# Patient Record
Sex: FEMALE | Race: WHITE | Employment: FULL TIME | URBAN - METROPOLITAN AREA
[De-identification: names, ages, dates, MRNs, and addresses within clinical notes are randomized per-mention and may not be internally consistent; named-entity substitution may affect disease eponyms.]

---

## 2017-04-17 ENCOUNTER — HOSPITAL ENCOUNTER (OUTPATIENT)
Dept: MAMMOGRAPHY | Facility: HOSPITAL | Age: 32
Discharge: HOME | End: 2017-04-17
Attending: SURGERY
Payer: COMMERCIAL

## 2017-04-17 DIAGNOSIS — D24.1 INTRADUCTAL PAPILLOMA OF RIGHT BREAST: ICD-10-CM

## 2017-04-17 PROCEDURE — 77066 DX MAMMO INCL CAD BI: CPT

## 2017-04-17 PROCEDURE — 76642 ULTRASOUND BREAST LIMITED: CPT

## 2017-04-17 PROCEDURE — 77062 BREAST TOMOSYNTHESIS BI: CPT

## 2017-06-14 NOTE — TELEPHONE ENCOUNTER
Pt needs appt (migraines) with dressler before any refills. Please have them call to schedule.  Thanks

## 2017-06-28 RX ORDER — TOPIRAMATE 100 MG/1
TABLET, FILM COATED ORAL
Qty: 135 TABLET | OUTPATIENT
Start: 2017-06-28

## 2017-10-13 DIAGNOSIS — Z12.39 BREAST SCREENING: Primary | ICD-10-CM

## 2017-10-27 DIAGNOSIS — D24.1 INTRADUCTAL PAPILLOMA OF BREAST, RIGHT: Primary | ICD-10-CM

## 2017-10-30 ENCOUNTER — HOSPITAL ENCOUNTER (OUTPATIENT)
Dept: MAMMOGRAPHY | Facility: HOSPITAL | Age: 32
Discharge: HOME | End: 2017-10-30
Attending: SURGERY
Payer: COMMERCIAL

## 2017-10-30 DIAGNOSIS — D24.1 INTRADUCTAL PAPILLOMA OF BREAST, RIGHT: ICD-10-CM

## 2017-10-30 DIAGNOSIS — Z12.39 BREAST SCREENING: ICD-10-CM

## 2017-10-30 PROCEDURE — 76641 ULTRASOUND BREAST COMPLETE: CPT | Performed by: SURGERY

## 2017-10-30 PROCEDURE — 77062 BREAST TOMOSYNTHESIS BI: CPT | Performed by: SURGERY

## 2017-10-30 PROCEDURE — 77066 DX MAMMO INCL CAD BI: CPT | Performed by: SURGERY

## 2017-11-06 ENCOUNTER — OFFICE VISIT (OUTPATIENT)
Dept: SURGERY | Facility: CLINIC | Age: 32
End: 2017-11-06

## 2017-11-06 VITALS
TEMPERATURE: 98 F | RESPIRATION RATE: 16 BRPM | HEIGHT: 64 IN | HEART RATE: 76 BPM | DIASTOLIC BLOOD PRESSURE: 74 MMHG | WEIGHT: 144 LBS | SYSTOLIC BLOOD PRESSURE: 114 MMHG | BODY MASS INDEX: 24.59 KG/M2

## 2017-11-06 DIAGNOSIS — Z80.41 FAMILY HISTORY OF OVARIAN CARCINOMA: ICD-10-CM

## 2017-11-06 DIAGNOSIS — Z80.3 FAMILY HISTORY OF BREAST CANCER: ICD-10-CM

## 2017-11-06 DIAGNOSIS — D24.1 INTRADUCTAL PAPILLOMA OF RIGHT BREAST: Primary | ICD-10-CM

## 2017-11-06 PROCEDURE — 99212 OFFICE O/P EST SF 10 MIN: CPT | Performed by: SURGERY

## 2017-11-06 NOTE — PATIENT INSTRUCTIONS
OPTIONS FOR MANAGING BENIGN BREAST PROBLEMS    Many women have changes in the way their breast feel or the way they look on mammograms: cyst, overactive milk ducts, and tenderness. These are usually caused by hormone changes.   The suggested nutritional an

## 2017-11-06 NOTE — PROGRESS NOTES
Follow Up Visit Note       Active Problems      1. Intraductal papilloma of right breast    2. Family history of ovarian carcinoma    3. Family history of breast cancer          Chief Complaint   Patient presents with:   Follow Up To Mammogram: Est Pt f/u a ORAL SURGERY PROCEDURE      Comment: Norman Teeth Extraction  1/1/99: OTHER SURGICAL HISTORY      Comment: hand surgery x4    The family history and social history have been reviewed by me today.     Family History   Problem Relation Age of Onset   • Ovaria Take  by mouth as needed. Disp:  Rfl:         Review of Systems  The Review of Systems has been reviewed by me during today.   Review of Systems     Physical Findings   /74   Pulse 76   Temp 98.1 °F (36.7 °C)   Resp 16   Ht 64\"   Wt 144 lb   LMP  (LM no change. 11:00 2 cm from the nipple 4 x 2 x 4 mm unchanged. Nodule 7:00 4 cm from the nipple 4 x 4 by 2 mm previously 5 x   4 x 2 mm therefore minimally smaller. 8:00 retroareolar small nodule 7 x 6 x 3 mm previously 7 x 5 x 3 mm essentially stable.  Left COUNSELOR  SHAHNAZ PARRA 2D+3D DIAGNOSTIC SHAHNAZ SHERWOOD (CPT=77066/54005)    Follow Up  Return in about 1 year (around 11/6/2018).     Laisha Proctor MD

## 2017-11-08 ENCOUNTER — OFFICE VISIT (OUTPATIENT)
Dept: GENETICS | Facility: HOSPITAL | Age: 32
End: 2017-11-08
Payer: COMMERCIAL

## 2017-11-08 DIAGNOSIS — Z80.41 FAMILY HISTORY OF OVARIAN CANCER: ICD-10-CM

## 2017-11-08 DIAGNOSIS — Z80.3 FAMILY HISTORY OF BREAST CANCER: Primary | ICD-10-CM

## 2017-11-08 PROCEDURE — 96040 HC GENETIC COUNSELING EA 30 MIN: CPT | Performed by: GENETIC COUNSELOR, MS

## 2017-11-10 NOTE — PROGRESS NOTES
Referring Provider: Dr. Toni Shah    Additional Provider: RM Cage    Reason for Referral:  Ms. Ruth Nye was referred for genetic counseling because of a family history of breast and ovarian cancer.   Ms. Santiago Gagandeep is a 33year-old two sons, ages 6 and 3, who are in good health. Please see the pedigree for additional family history information. There is no known Ashkenazi Scientologist ancestry    Psychosocial History:   Ms. Karyn Saenz is .   She is a hairstylist.  She indicated signi were discussed including the potential implications of test results on clinical management.      If a gene mutation is not identified (negative result), it is still possible that Ms. Mirza Kohli has a mutation in one of these genes that was not detected by the g individuals with mutations in these genes. If she were to test positive for a deleterious mutation, her first degree family members would have a 50% chance of carrying the same mutation.   At-risk adults (>18) would have the option of pursuing targeted gene

## 2017-11-17 ENCOUNTER — GENETICS ENCOUNTER (OUTPATIENT)
Dept: GENETICS | Facility: HOSPITAL | Age: 32
End: 2017-11-17

## 2017-11-17 NOTE — PROGRESS NOTES
Referring Provider: Dr. Judy Duron    Additional Provider: RM Rodriguez    Reason for Referral:  Ms. Jesika Duran had [facility] [place] genetic testing performed on 11/8/17 because of a family history of breast and ovarian cancer.        Genetic Te meet NCCN guidelines for increased breast cancer screening, including breast MRI and mammograms. I briefly discussed benefits and limitations of breast MRI. MsGrisel Hayward was encouraged to further discuss her breast screening with her physicians.        Ms. Kusum Diaz

## 2018-08-06 ENCOUNTER — TELEPHONE (OUTPATIENT)
Dept: FAMILY MEDICINE CLINIC | Facility: CLINIC | Age: 33
End: 2018-08-06

## 2018-08-06 DIAGNOSIS — Z00.00 LABORATORY EXAMINATION ORDERED AS PART OF A ROUTINE GENERAL MEDICAL EXAMINATION: Primary | ICD-10-CM

## 2018-08-06 NOTE — TELEPHONE ENCOUNTER
cpx appt is with stephanie dressler. **see physical pended lab orders-advise any other labs and return to triage to call pt-thanks!

## 2018-08-06 NOTE — TELEPHONE ENCOUNTER
Pt has physical scheduled for 8/13/18. She would like to know if she should do any blood work prior to appt. She also was bit by a tic yesterday. She has not seen a rash or bullseye but would like to know what to do. Please advise. Thank you.

## 2018-08-06 NOTE — TELEPHONE ENCOUNTER
Labs ordered. Where did she contract the tick ([place], [place] Was tick removed? How long attached to her?

## 2018-08-06 NOTE — TELEPHONE ENCOUNTER
Call to pt-advised edward lab orders (fasting blood work and urine) placed. Pt sts was at her sister's new house in [address], only ffelt tick and swiped it off, does not believe it was imbedded at all. Advised will update lucie slade this info.  Instruct

## 2018-08-08 ENCOUNTER — APPOINTMENT (OUTPATIENT)
Dept: LAB | Age: 33
End: 2018-08-08
Attending: FAMILY MEDICINE
Payer: COMMERCIAL

## 2018-08-08 DIAGNOSIS — Z00.00 LABORATORY EXAMINATION ORDERED AS PART OF A ROUTINE GENERAL MEDICAL EXAMINATION: ICD-10-CM

## 2018-08-08 PROCEDURE — 80061 LIPID PANEL: CPT

## 2018-08-08 PROCEDURE — 85027 COMPLETE CBC AUTOMATED: CPT

## 2018-08-08 PROCEDURE — 80053 COMPREHEN METABOLIC PANEL: CPT

## 2018-08-08 PROCEDURE — 36415 COLL VENOUS BLD VENIPUNCTURE: CPT

## 2018-08-08 PROCEDURE — 84443 ASSAY THYROID STIM HORMONE: CPT

## 2018-08-13 ENCOUNTER — OFFICE VISIT (OUTPATIENT)
Dept: FAMILY MEDICINE CLINIC | Facility: CLINIC | Age: 33
End: 2018-08-13
Payer: COMMERCIAL

## 2018-08-13 VITALS
HEART RATE: 76 BPM | OXYGEN SATURATION: 99 % | DIASTOLIC BLOOD PRESSURE: 60 MMHG | SYSTOLIC BLOOD PRESSURE: 100 MMHG | HEIGHT: 64 IN | RESPIRATION RATE: 16 BRPM | WEIGHT: 146.75 LBS | BODY MASS INDEX: 25.05 KG/M2

## 2018-08-13 DIAGNOSIS — F41.9 ANXIETY: ICD-10-CM

## 2018-08-13 DIAGNOSIS — R79.9 ABNORMAL BLOOD CHEMISTRY: ICD-10-CM

## 2018-08-13 DIAGNOSIS — Z00.00 ROUTINE GENERAL MEDICAL EXAMINATION AT A HEALTH CARE FACILITY: Primary | ICD-10-CM

## 2018-08-13 DIAGNOSIS — N94.10 DYSPAREUNIA IN FEMALE: ICD-10-CM

## 2018-08-13 PROCEDURE — 99213 OFFICE O/P EST LOW 20 MIN: CPT | Performed by: FAMILY MEDICINE

## 2018-08-13 PROCEDURE — 88175 CYTOPATH C/V AUTO FLUID REDO: CPT | Performed by: FAMILY MEDICINE

## 2018-08-13 PROCEDURE — 87624 HPV HI-RISK TYP POOLED RSLT: CPT | Performed by: FAMILY MEDICINE

## 2018-08-13 PROCEDURE — 99395 PREV VISIT EST AGE 18-39: CPT | Performed by: FAMILY MEDICINE

## 2018-08-13 RX ORDER — SERTRALINE HYDROCHLORIDE 25 MG/1
25 TABLET, FILM COATED ORAL DAILY
Qty: 30 TABLET | Refills: 1 | Status: SHIPPED | OUTPATIENT
Start: 2018-08-13 | End: 2018-09-11

## 2018-08-13 NOTE — PROGRESS NOTES
CHIEF COMPLAINT   Well woman exam  HPI:   Max Rowley is a 29year old female who presents for a complete physical exam.    Seeing neuro for migraines - dr. Ghanshyam Hartmann. Had labs done - here to review.     Procedure on cervix more than 10 years ago - needed. Disp:  Rfl:    Cyclobenzaprine HCl 5 MG Oral Tab Take 1 tablet (5 mg total) by mouth 3 (three) times daily as needed for Muscle spasms.  1/2 to 1 po bid prn Disp: 60 tablet Rfl: 1      Past Medical History:   Diagnosis Date   • Laboratory examinatio REVIEW OF SYSTEMS:   GENERAL: feels well otherwise  SKIN: no complaint of any unusual skin lesions  EYES: no complaint of blurred vision or double vision  HEENT: no complaint of nasal congestion, sinus pain or ST  LUNGS: no complaint of shortness of br this.  We discussed starting Zoloft 25 mg once daily. She was given a prescription for this. I discussed potential side effects. She was advised to follow-up with me in 4 weeks. We also reviewed her recent blood work. Repeat a CMP in 4 weeks.   Refer t

## 2018-08-14 LAB — HPV I/H RISK 1 DNA SPEC QL NAA+PROBE: NEGATIVE

## 2018-09-07 ENCOUNTER — APPOINTMENT (OUTPATIENT)
Dept: LAB | Age: 33
End: 2018-09-07
Attending: FAMILY MEDICINE
Payer: COMMERCIAL

## 2018-09-07 DIAGNOSIS — R79.9 ABNORMAL BLOOD CHEMISTRY: ICD-10-CM

## 2018-09-07 LAB
ALBUMIN SERPL-MCNC: 4 G/DL (ref 3.5–4.8)
ALBUMIN/GLOB SERPL: 1.5 {RATIO} (ref 1–2)
ALP LIVER SERPL-CCNC: 35 U/L (ref 37–98)
ALT SERPL-CCNC: 23 U/L (ref 14–54)
ANION GAP SERPL CALC-SCNC: 7 MMOL/L (ref 0–18)
AST SERPL-CCNC: 10 U/L (ref 15–41)
BILIRUB SERPL-MCNC: 0.6 MG/DL (ref 0.1–2)
BUN BLD-MCNC: 12 MG/DL (ref 8–20)
BUN/CREAT SERPL: 11.4 (ref 10–20)
CALCIUM BLD-MCNC: 8.6 MG/DL (ref 8.3–10.3)
CHLORIDE SERPL-SCNC: 111 MMOL/L (ref 101–111)
CO2 SERPL-SCNC: 21 MMOL/L (ref 22–32)
CREAT BLD-MCNC: 1.05 MG/DL (ref 0.55–1.02)
GLOBULIN PLAS-MCNC: 2.7 G/DL (ref 2.5–4)
GLUCOSE BLD-MCNC: 92 MG/DL (ref 70–99)
M PROTEIN MFR SERPL ELPH: 6.7 G/DL (ref 6.1–8.3)
OSMOLALITY SERPL CALC.SUM OF ELEC: 287 MOSM/KG (ref 275–295)
POTASSIUM SERPL-SCNC: 4.4 MMOL/L (ref 3.6–5.1)
SODIUM SERPL-SCNC: 139 MMOL/L (ref 136–144)

## 2018-09-07 PROCEDURE — 80053 COMPREHEN METABOLIC PANEL: CPT

## 2018-09-07 PROCEDURE — 36415 COLL VENOUS BLD VENIPUNCTURE: CPT

## 2018-09-10 ENCOUNTER — TELEPHONE (OUTPATIENT)
Dept: FAMILY MEDICINE CLINIC | Facility: CLINIC | Age: 33
End: 2018-09-10

## 2018-09-10 DIAGNOSIS — R79.89 ELEVATED SERUM CREATININE: Primary | ICD-10-CM

## 2018-09-11 NOTE — PROGRESS NOTES
Sonido Beyer is a 29year old female. CHIEF COMPLAINT   Follow up on anxiety  HPI:   Some improvement with zoloft. Able to control frustration better. Still a lot of effort to control her frustration sowould like to try increasing the zoloft dose. History    Tobacco Use      Smoking status: Never Smoker      Smokeless tobacco: Never Used    Alcohol use: Yes      Comment: Occasionally    Drug use: No       REVIEW OF SYSTEMS:   GENERAL HEALTH: feels well otherwise  SKIN: denies any unusual skin lesion

## 2018-09-16 ENCOUNTER — HOSPITAL ENCOUNTER (OUTPATIENT)
Dept: ULTRASOUND IMAGING | Age: 33
Discharge: HOME | End: 2018-09-16
Attending: FAMILY MEDICINE
Payer: COMMERCIAL

## 2018-09-16 DIAGNOSIS — R10.2 PELVIC PAIN: ICD-10-CM

## 2018-09-16 PROCEDURE — 76856 US EXAM PELVIC COMPLETE: CPT | Performed by: FAMILY MEDICINE

## 2018-09-16 PROCEDURE — 76830 TRANSVAGINAL US NON-OB: CPT | Performed by: FAMILY MEDICINE

## 2018-09-16 PROCEDURE — 93975 VASCULAR STUDY: CPT | Performed by: FAMILY MEDICINE

## 2018-10-29 ENCOUNTER — OFFICE VISIT (OUTPATIENT)
Dept: OBGYN CLINIC | Facility: CLINIC | Age: 33
End: 2018-10-29
Payer: COMMERCIAL

## 2018-10-29 VITALS
SYSTOLIC BLOOD PRESSURE: 120 MMHG | WEIGHT: 142 LBS | BODY MASS INDEX: 23.95 KG/M2 | DIASTOLIC BLOOD PRESSURE: 68 MMHG | HEIGHT: 64.5 IN

## 2018-10-29 DIAGNOSIS — Z30.09 COUNSELING FOR BIRTH CONTROL REGARDING INTRAUTERINE DEVICE (IUD): Primary | ICD-10-CM

## 2018-10-29 PROCEDURE — 99203 OFFICE O/P NEW LOW 30 MIN: CPT | Performed by: NURSE PRACTITIONER

## 2018-10-29 NOTE — PROGRESS NOTES
Here for new gynecology visit. 29year old G 2 P 2. No LMP recorded (lmp unknown). Patient is not currently having periods (Reason: IUD - Intrauterine Device). .     Here to discuss replacement of her Mirena IUD.  She had her last one placed 9/2013 and wou (ADVIL) 200 MG Oral Cap Take  by mouth as needed. Disp:  Rfl:    Butalbital-APAP-Caffeine -40 MG Oral Tab Take 1 tablet by mouth every 4 (four) hours as needed for Pain.  Disp: 90 tablet Rfl: 3   Cyclobenzaprine HCl 5 MG Oral Tab Take 1 tablet (5 mg t procedure. RTC for IUD removal and new Mirena insertion.

## 2018-10-29 NOTE — PROGRESS NOTES
Maddie Lozada is a 29year old female. CHIEF COMPLAINT   Follow up on anxiety  HPI:   Here for follow up on zoloft. Anxiety is much better with medication. More patient with her kids. Dose seems adequate. No depression.   Doesn't want to do  encounter. Meds & Refills for this Visit:  Requested Prescriptions     Signed Prescriptions Disp Refills   • Sertraline HCl (ZOLOFT) 50 MG Oral Tab 90 tablet 1     Sig: Take 1 tablet (50 mg total) by mouth daily. Patient declined counseling.   See in

## 2018-10-31 ENCOUNTER — TELEPHONE (OUTPATIENT)
Dept: FAMILY MEDICINE CLINIC | Facility: CLINIC | Age: 33
End: 2018-10-31

## 2018-10-31 NOTE — TELEPHONE ENCOUNTER
Spoke to patient and just as it says, white spots like whiteheads around each nipple that are tender to the touch noticed this AM. See mammo 2016-fibroadenoma/papiloma at that time. Not pregnant. Breast size unchanged and no other symptoms.

## 2018-10-31 NOTE — TELEPHONE ENCOUNTER
1. What are your symptoms? White spots around nipples, both sides look like white heads and very tender. Asked her mom and she said its looks like when her breast were engorged whit breastfeeding.  Pt said she is not pregnant and actually couldn't breastfe

## 2018-10-31 NOTE — TELEPHONE ENCOUNTER
It's hard to say without seeing it but if there are many (more than 5) and they are around both nipples, it is likely the [place] glands which become more prominent with pregnancy and breastfeeding.   Since she isn't breastfeeding and presumably not preg

## 2018-10-31 NOTE — TELEPHONE ENCOUNTER
Call to pt-advised lucie ABDALLA is out of ofc today but did review her call info from this morning. Advised of lucie ABDALLA comments/recommendations. Pt sts \"just saw lucie ABDALLA on Monday.  Had gyne appt same day w plan to have IUD removed because it

## 2018-11-01 ENCOUNTER — TELEPHONE (OUTPATIENT)
Dept: OBGYN CLINIC | Facility: CLINIC | Age: 33
End: 2018-11-01

## 2018-11-01 DIAGNOSIS — N91.2 AMENORRHEA: Primary | ICD-10-CM

## 2018-11-01 NOTE — TELEPHONE ENCOUNTER
Jaime allredg to ID VM     Per Nikolai ABBASI needs to be seen for the white spots in her breast    And that she needs to ask her OB GYNE re when to repeat pregnancy test

## 2018-11-01 NOTE — TELEPHONE ENCOUNTER
Patient states she has white spots on breasts, and  her primary doctor is concerned that she might be  pregnant. She does have and IUD and took a pregnancy test that came back negative.  Please call to advise

## 2018-11-01 NOTE — TELEPHONE ENCOUNTER
If she would feel better having an HCG she can come in for a qualitative. If they persist, become tender, or other changes she should come in for evaluation.

## 2018-11-01 NOTE — TELEPHONE ENCOUNTER
30 y/o calling with raised, white spots on nipple. She was advised to f/u with her gyne by PCP. She was just seen on 10/29/18. She is going to have her IUD replaced in near future. She now has concerns for pregnancy.  She took a UPT that was negative but sh

## 2018-11-01 NOTE — TELEPHONE ENCOUNTER
Per pt she has an appt with her OB/GYNE 11/12/2018 and will discuss further with them.   She states she will call them today to find out when to recheck the pregnancy test.

## 2018-11-01 NOTE — TELEPHONE ENCOUNTER
She should ask her gyne when they would like her to repeat a pregnancy test if she took one and was negative but isn't certain that she isn't pregnant.

## 2018-11-02 ENCOUNTER — LAB ENCOUNTER (OUTPATIENT)
Dept: LAB | Age: 33
End: 2018-11-02
Attending: NURSE PRACTITIONER
Payer: COMMERCIAL

## 2018-11-02 DIAGNOSIS — N91.2 AMENORRHEA: ICD-10-CM

## 2018-11-02 PROCEDURE — 36415 COLL VENOUS BLD VENIPUNCTURE: CPT

## 2018-11-02 PROCEDURE — 84703 CHORIONIC GONADOTROPIN ASSAY: CPT

## 2018-11-08 ENCOUNTER — TELEPHONE (OUTPATIENT)
Dept: OBGYN CLINIC | Facility: CLINIC | Age: 33
End: 2018-11-08

## 2018-11-08 NOTE — TELEPHONE ENCOUNTER
Received previous medical records from [facility].  Pt seen her on 10/29/18 and has f/u appt on    Future Appointments   Date Time Provider Alyssa Hutchison   11/12/2018 12:30 PM NAOMI Curran [facility]   3/18/2019 10

## 2018-11-12 ENCOUNTER — OFFICE VISIT (OUTPATIENT)
Dept: OBGYN CLINIC | Facility: CLINIC | Age: 33
End: 2018-11-12
Payer: COMMERCIAL

## 2018-11-12 VITALS
WEIGHT: 148 LBS | SYSTOLIC BLOOD PRESSURE: 124 MMHG | DIASTOLIC BLOOD PRESSURE: 64 MMHG | BODY MASS INDEX: 25.27 KG/M2 | HEIGHT: 64 IN

## 2018-11-12 DIAGNOSIS — Z01.812 PRE-PROCEDURAL LABORATORY EXAMINATION: ICD-10-CM

## 2018-11-12 DIAGNOSIS — Z30.433 ENCOUNTER FOR REMOVAL AND REINSERTION OF IUD: Primary | ICD-10-CM

## 2018-11-12 PROCEDURE — 58301 REMOVE INTRAUTERINE DEVICE: CPT | Performed by: NURSE PRACTITIONER

## 2018-11-12 PROCEDURE — 81025 URINE PREGNANCY TEST: CPT | Performed by: NURSE PRACTITIONER

## 2018-11-12 PROCEDURE — 58300 INSERT INTRAUTERINE DEVICE: CPT | Performed by: NURSE PRACTITIONER

## 2018-11-12 NOTE — PROGRESS NOTES
Procedure:    S:  The patient was consented for removal of her current Mirena IUD and new Mirena placement. Risks and benefits were discussed including infection, uterine perforation, uterine expulsion, PID, ectopic and intrauterine pregnancy.  All ricardo

## 2018-12-04 NOTE — TELEPHONE ENCOUNTER
Prenatal record 2013 delivery  Prenatal records 2009 delivery  2009 IUD Mirena placement  Mirena placement 9/2013- last visit was IUD check 2013  Last normal pap 9/2013  All labs and US reviewed in the timeframe above.    Records will be sent to scan into E

## 2018-12-10 ENCOUNTER — OFFICE VISIT (OUTPATIENT)
Dept: OBGYN CLINIC | Facility: CLINIC | Age: 33
End: 2018-12-10
Payer: COMMERCIAL

## 2018-12-10 VITALS
WEIGHT: 147 LBS | DIASTOLIC BLOOD PRESSURE: 68 MMHG | BODY MASS INDEX: 25.1 KG/M2 | SYSTOLIC BLOOD PRESSURE: 114 MMHG | HEIGHT: 64 IN | HEART RATE: 60 BPM

## 2018-12-10 DIAGNOSIS — Z30.431 IUD CHECK UP: Primary | ICD-10-CM

## 2018-12-10 PROCEDURE — 99212 OFFICE O/P EST SF 10 MIN: CPT | Performed by: NURSE PRACTITIONER

## 2018-12-10 NOTE — PROGRESS NOTES
Gyne note     S: patient is a 29year old yo  here for IUD check   Bleeding: regular menses, no breakthrough bleeding after insertion.   Pain: minimal cramping after placement for 4 days, now none     O:/68   Pulse 60   Ht 64\"   Wt 147 lb   LM

## 2018-12-11 ENCOUNTER — MED REC SCAN ONLY (OUTPATIENT)
Dept: OBGYN CLINIC | Facility: CLINIC | Age: 33
End: 2018-12-11

## 2019-05-20 ENCOUNTER — OFFICE VISIT (OUTPATIENT)
Dept: FAMILY MEDICINE CLINIC | Facility: CLINIC | Age: 34
End: 2019-05-20
Payer: COMMERCIAL

## 2019-05-20 VITALS
SYSTOLIC BLOOD PRESSURE: 106 MMHG | DIASTOLIC BLOOD PRESSURE: 70 MMHG | HEIGHT: 64 IN | BODY MASS INDEX: 24.41 KG/M2 | WEIGHT: 143 LBS | HEART RATE: 80 BPM | TEMPERATURE: 99 F | RESPIRATION RATE: 12 BRPM

## 2019-05-20 DIAGNOSIS — H92.01 RIGHT EAR PAIN: Primary | ICD-10-CM

## 2019-05-20 PROCEDURE — 99213 OFFICE O/P EST LOW 20 MIN: CPT | Performed by: FAMILY MEDICINE

## 2019-05-20 RX ORDER — NEOMYCIN SULFATE, POLYMYXIN B SULFATE, HYDROCORTISONE 3.5; 10000; 1 MG/ML; [USP'U]/ML; MG/ML
SOLUTION/ DROPS AURICULAR (OTIC)
Qty: 1 BOTTLE | Refills: 0 | Status: SHIPPED | OUTPATIENT
Start: 2019-05-20 | End: 2019-07-10 | Stop reason: ALTCHOICE

## 2019-05-20 RX ORDER — CEPHALEXIN 500 MG/1
500 CAPSULE ORAL 2 TIMES DAILY
Qty: 20 CAPSULE | Refills: 0 | Status: SHIPPED | OUTPATIENT
Start: 2019-05-20 | End: 2019-07-10 | Stop reason: ALTCHOICE

## 2019-05-20 NOTE — PROGRESS NOTES
Shai Morejon is a 36year old female. CHIEF COMPLAINT   Right ear pain  HPI:   Right ear pain started yesterday. Slightly itchy for a couple of months. Woke up yesterday and the ear was red and radiated into her jaw. Wind bothered her yesterday. along the middle part of the pinna with slightly dry skin.   Canal with small amount of cerumen so unable to visualize entire canal.  Visualized portion of TM is normal.   NECK: supple,no adenopathy  CARDIO: RRR without murmur    ASSESSMENT AND PLAN:     Ri

## 2019-06-18 DIAGNOSIS — Z80.41 FAMILY HISTORY OF OVARIAN CANCER: ICD-10-CM

## 2019-06-18 DIAGNOSIS — D24.1 INTRADUCTAL PAPILLOMA OF BREAST, RIGHT: Primary | ICD-10-CM

## 2019-06-18 DIAGNOSIS — Z80.3 FAMILY HISTORY OF BREAST CANCER: ICD-10-CM

## 2019-07-02 ENCOUNTER — HOSPITAL ENCOUNTER (OUTPATIENT)
Dept: MAMMOGRAPHY | Facility: HOSPITAL | Age: 34
Discharge: HOME | End: 2019-07-02
Attending: SURGERY
Payer: COMMERCIAL

## 2019-07-02 DIAGNOSIS — Z80.41 FAMILY HISTORY OF OVARIAN CANCER: ICD-10-CM

## 2019-07-02 DIAGNOSIS — Z80.3 FAMILY HISTORY OF BREAST CANCER: ICD-10-CM

## 2019-07-02 DIAGNOSIS — D24.1 INTRADUCTAL PAPILLOMA OF BREAST, RIGHT: ICD-10-CM

## 2019-07-02 PROCEDURE — 77066 DX MAMMO INCL CAD BI: CPT | Performed by: SURGERY

## 2019-07-02 PROCEDURE — 76641 ULTRASOUND BREAST COMPLETE: CPT | Performed by: SURGERY

## 2019-07-02 PROCEDURE — 77062 BREAST TOMOSYNTHESIS BI: CPT | Performed by: SURGERY

## 2019-07-10 ENCOUNTER — OFFICE VISIT (OUTPATIENT)
Dept: SURGERY | Facility: CLINIC | Age: 34
End: 2019-07-10
Payer: COMMERCIAL

## 2019-07-10 VITALS
DIASTOLIC BLOOD PRESSURE: 76 MMHG | RESPIRATION RATE: 16 BRPM | SYSTOLIC BLOOD PRESSURE: 111 MMHG | HEART RATE: 80 BPM | BODY MASS INDEX: 25.78 KG/M2 | WEIGHT: 151 LBS | HEIGHT: 64 IN

## 2019-07-10 DIAGNOSIS — N63.0 BREAST NODULE: ICD-10-CM

## 2019-07-10 DIAGNOSIS — Z80.3 FAMILY HISTORY OF BREAST CANCER: Primary | ICD-10-CM

## 2019-07-10 DIAGNOSIS — D24.1 INTRADUCTAL PAPILLOMA OF RIGHT BREAST: ICD-10-CM

## 2019-07-10 PROCEDURE — 99213 OFFICE O/P EST LOW 20 MIN: CPT | Performed by: SURGERY

## 2019-07-10 NOTE — PROGRESS NOTES
Follow Up Visit Note       Active Problems      1. Family history of breast cancer    2. Breast nodule    3. Intraductal papilloma of right breast          Chief Complaint   Patient presents with:   Follow - Up: f/u mammogram done 7/2        History of Pres BIOPSY Right 2016    Performed by Adalberto Morejon MD at [facility] MAIN OR   • SHAHNAZ NEEDLE LOCALIZATION W/ SPECIMEN 1 SITE RIGHT  2016    Benign, papilloma   • NEEDLE BIOPSY RIGHT  2016    David-cut with Dr. Zoey Bowers - papilloma   •       Times 1   • (ZOLOFT) 50 MG Oral Tab, Take 1 tablet (50 mg total) by mouth daily. , Disp: 90 tablet, Rfl: 1  •  TOPIRAMATE 100 MG Oral Tab, TAKE 1 AND 1/2 TABLETS BY  MOUTH DAILY, Disp: 135 tablet, Rfl: 3  •  GABAPENTIN 100 MG Oral Cap, TAKE 3 CAPSULES BY MOUTH  NIGHTLY Conjunctivae and EOM are normal. No scleral icterus. Neck: Trachea normal and full passive range of motion without pain. Neck supple. No JVD present. Pulmonary/Chest: She exhibits no mass.  Right breast exhibits no inverted nipple, no mass, no nipple di breast   there is a 5 x 4 x 2 mm hypoechoic nodule possibly representing a cyst or small fibroadenoma. This was not previously identified. Recommend 6 month follow-up both breasts with ultrasound to assess for stability of these probably benign nodules.

## 2019-10-10 ENCOUNTER — TELEPHONE (OUTPATIENT)
Dept: SURGERY | Facility: CLINIC | Age: 34
End: 2019-10-10

## 2019-10-10 NOTE — TELEPHONE ENCOUNTER
vd medical records request from PT.'s insurance. I completed a Scan STAT form and sent it to Scan STAT in the green folder.

## 2019-12-27 ENCOUNTER — TELEPHONE (OUTPATIENT)
Dept: SURGERY | Facility: CLINIC | Age: 34
End: 2019-12-27

## 2019-12-27 NOTE — TELEPHONE ENCOUNTER
Per Zana MCKEON at [facility] no authorization is needed for MRI breast CPT 82733. REF# [id number]     Notified patient, advised to call central scheduling to schedule imaging. Patient voiced understanding.

## 2020-01-02 NOTE — TELEPHONE ENCOUNTER
Please call pt to schedule anxiety med check with José Miguel Parada. Last med check: 10/29/18    Thank you.

## 2020-01-07 NOTE — TELEPHONE ENCOUNTER
Not protocol medication. LOV :10/29/18 anxiety   Last labs done : 8/8/18  Next appointment :2/4/20 physical   Please see pending medication. Refill if appropriate.    Last refill:    Date:10/18/19  Amount :90 tablets no refills   Medication: sertraline hc

## 2020-01-10 ENCOUNTER — HOSPITAL ENCOUNTER (OUTPATIENT)
Dept: MRI IMAGING | Facility: HOSPITAL | Age: 35
Discharge: HOME | End: 2020-01-10
Attending: SURGERY
Payer: COMMERCIAL

## 2020-01-10 DIAGNOSIS — D24.1 INTRADUCTAL PAPILLOMA OF RIGHT BREAST: ICD-10-CM

## 2020-01-10 DIAGNOSIS — N63.0 BREAST NODULE: ICD-10-CM

## 2020-01-10 DIAGNOSIS — Z80.3 FAMILY HISTORY OF BREAST CANCER: ICD-10-CM

## 2020-01-10 PROCEDURE — 77049 MRI BREAST C-+ W/CAD BI: CPT | Performed by: SURGERY

## 2020-01-10 PROCEDURE — A9575 INJ GADOTERATE MEGLUMI 0.1ML: HCPCS | Performed by: SURGERY

## 2020-01-23 ENCOUNTER — HOSPITAL ENCOUNTER (OUTPATIENT)
Dept: MAMMOGRAPHY | Facility: HOSPITAL | Age: 35
Discharge: HOME | End: 2020-01-23
Attending: SURGERY
Payer: COMMERCIAL

## 2020-01-23 DIAGNOSIS — D24.1 INTRADUCTAL PAPILLOMA OF RIGHT BREAST: ICD-10-CM

## 2020-01-23 PROCEDURE — 76642 ULTRASOUND BREAST LIMITED: CPT | Performed by: SURGERY

## 2020-01-28 ENCOUNTER — TELEPHONE (OUTPATIENT)
Dept: SURGERY | Facility: CLINIC | Age: 35
End: 2020-01-28

## 2020-01-28 DIAGNOSIS — Z80.41 FAMILY HISTORY OF OVARIAN CANCER: ICD-10-CM

## 2020-01-28 DIAGNOSIS — D24.1 INTRADUCTAL PAPILLOMA OF RIGHT BREAST: ICD-10-CM

## 2020-01-28 DIAGNOSIS — Z80.3 FAMILY HISTORY OF BREAST CANCER: Primary | ICD-10-CM

## 2020-01-28 NOTE — TELEPHONE ENCOUNTER
I discussed with the patient her ultrasound results, which are stable. We also discussed her MRI, which is benign. The patient is interested in moving forward with a prophylactic bilateral mastectomy.   At this time, however, her mother is having some hea

## 2020-02-04 ENCOUNTER — OFFICE VISIT (OUTPATIENT)
Dept: FAMILY MEDICINE CLINIC | Facility: CLINIC | Age: 35
End: 2020-02-04
Payer: COMMERCIAL

## 2020-02-04 ENCOUNTER — LAB ENCOUNTER (OUTPATIENT)
Dept: LAB | Age: 35
End: 2020-02-04
Attending: FAMILY MEDICINE
Payer: COMMERCIAL

## 2020-02-04 VITALS
HEIGHT: 64 IN | DIASTOLIC BLOOD PRESSURE: 70 MMHG | RESPIRATION RATE: 12 BRPM | HEART RATE: 68 BPM | TEMPERATURE: 98 F | BODY MASS INDEX: 26.12 KG/M2 | WEIGHT: 153 LBS | SYSTOLIC BLOOD PRESSURE: 106 MMHG

## 2020-02-04 DIAGNOSIS — R11.0 NAUSEA: ICD-10-CM

## 2020-02-04 DIAGNOSIS — R82.90 ABNORMAL URINE ODOR: ICD-10-CM

## 2020-02-04 DIAGNOSIS — F41.9 ANXIETY: ICD-10-CM

## 2020-02-04 DIAGNOSIS — R10.9 ABDOMINAL PAIN, UNSPECIFIED ABDOMINAL LOCATION: ICD-10-CM

## 2020-02-04 DIAGNOSIS — R42 LIGHTHEADED: ICD-10-CM

## 2020-02-04 DIAGNOSIS — Z00.00 ROUTINE GENERAL MEDICAL EXAMINATION AT A HEALTH CARE FACILITY: Primary | ICD-10-CM

## 2020-02-04 DIAGNOSIS — Z12.4 PAP SMEAR FOR CERVICAL CANCER SCREENING: ICD-10-CM

## 2020-02-04 DIAGNOSIS — Z11.51 SCREENING FOR HPV (HUMAN PAPILLOMAVIRUS): ICD-10-CM

## 2020-02-04 LAB
ALBUMIN SERPL-MCNC: 4.2 G/DL (ref 3.4–5)
ALBUMIN/GLOB SERPL: 1.4 {RATIO} (ref 1–2)
ALP LIVER SERPL-CCNC: 43 U/L (ref 37–98)
ALT SERPL-CCNC: 15 U/L (ref 13–56)
ANION GAP SERPL CALC-SCNC: 7 MMOL/L (ref 0–18)
AST SERPL-CCNC: 10 U/L (ref 15–37)
BASOPHILS # BLD AUTO: 0.03 X10(3) UL (ref 0–0.2)
BASOPHILS NFR BLD AUTO: 0.6 %
BILIRUB SERPL-MCNC: 0.5 MG/DL (ref 0.1–2)
BILIRUB UR QL STRIP.AUTO: NEGATIVE
BUN BLD-MCNC: 7 MG/DL (ref 7–18)
BUN/CREAT SERPL: 7.5 (ref 10–20)
CALCIUM BLD-MCNC: 8.4 MG/DL (ref 8.5–10.1)
CHLORIDE SERPL-SCNC: 112 MMOL/L (ref 98–112)
CLARITY UR REFRACT.AUTO: CLEAR
CO2 SERPL-SCNC: 21 MMOL/L (ref 21–32)
COLOR UR AUTO: YELLOW
CREAT BLD-MCNC: 0.93 MG/DL (ref 0.55–1.02)
DEPRECATED HBV CORE AB SER IA-ACNC: 38.7 NG/ML (ref 12–160)
DEPRECATED RDW RBC AUTO: 43.9 FL (ref 35.1–46.3)
EOSINOPHIL # BLD AUTO: 0.14 X10(3) UL (ref 0–0.7)
EOSINOPHIL NFR BLD AUTO: 2.7 %
ERYTHROCYTE [DISTWIDTH] IN BLOOD BY AUTOMATED COUNT: 13.3 % (ref 11–15)
GLOBULIN PLAS-MCNC: 2.9 G/DL (ref 2.8–4.4)
GLUCOSE BLD-MCNC: 88 MG/DL (ref 70–99)
GLUCOSE UR STRIP.AUTO-MCNC: NEGATIVE MG/DL
HCT VFR BLD AUTO: 44.5 % (ref 35–48)
HGB BLD-MCNC: 15.2 G/DL (ref 12–16)
IMM GRANULOCYTES # BLD AUTO: 0.02 X10(3) UL (ref 0–1)
IMM GRANULOCYTES NFR BLD: 0.4 %
KETONES UR STRIP.AUTO-MCNC: NEGATIVE MG/DL
LEUKOCYTE ESTERASE UR QL STRIP.AUTO: NEGATIVE
LYMPHOCYTES # BLD AUTO: 1.88 X10(3) UL (ref 1–4)
LYMPHOCYTES NFR BLD AUTO: 35.9 %
M PROTEIN MFR SERPL ELPH: 7.1 G/DL (ref 6.4–8.2)
MCH RBC QN AUTO: 31.1 PG (ref 26–34)
MCHC RBC AUTO-ENTMCNC: 34.2 G/DL (ref 31–37)
MCV RBC AUTO: 91.2 FL (ref 80–100)
MONOCYTES # BLD AUTO: 0.36 X10(3) UL (ref 0.1–1)
MONOCYTES NFR BLD AUTO: 6.9 %
NEUTROPHILS # BLD AUTO: 2.81 X10 (3) UL (ref 1.5–7.7)
NEUTROPHILS # BLD AUTO: 2.81 X10(3) UL (ref 1.5–7.7)
NEUTROPHILS NFR BLD AUTO: 53.5 %
NITRITE UR QL STRIP.AUTO: NEGATIVE
OSMOLALITY SERPL CALC.SUM OF ELEC: 287 MOSM/KG (ref 275–295)
PATIENT FASTING Y/N/NP: NO
PH UR STRIP.AUTO: 6 [PH] (ref 4.5–8)
PLATELET # BLD AUTO: 218 10(3)UL (ref 150–450)
POTASSIUM SERPL-SCNC: 4.1 MMOL/L (ref 3.5–5.1)
PROT UR STRIP.AUTO-MCNC: NEGATIVE MG/DL
RBC # BLD AUTO: 4.88 X10(6)UL (ref 3.8–5.3)
RBC UR QL AUTO: NEGATIVE
SODIUM SERPL-SCNC: 140 MMOL/L (ref 136–145)
SP GR UR STRIP.AUTO: 1.01 (ref 1–1.03)
T4 FREE SERPL-MCNC: 0.9 NG/DL (ref 0.8–1.7)
TSI SER-ACNC: 2.8 MIU/ML (ref 0.36–3.74)
UROBILINOGEN UR STRIP.AUTO-MCNC: <2 MG/DL
WBC # BLD AUTO: 5.2 X10(3) UL (ref 4–11)

## 2020-02-04 PROCEDURE — 36415 COLL VENOUS BLD VENIPUNCTURE: CPT

## 2020-02-04 PROCEDURE — 84439 ASSAY OF FREE THYROXINE: CPT

## 2020-02-04 PROCEDURE — 85025 COMPLETE CBC W/AUTO DIFF WBC: CPT

## 2020-02-04 PROCEDURE — 80053 COMPREHEN METABOLIC PANEL: CPT

## 2020-02-04 PROCEDURE — 87624 HPV HI-RISK TYP POOLED RSLT: CPT | Performed by: FAMILY MEDICINE

## 2020-02-04 PROCEDURE — 87086 URINE CULTURE/COLONY COUNT: CPT

## 2020-02-04 PROCEDURE — 87186 SC STD MICRODIL/AGAR DIL: CPT

## 2020-02-04 PROCEDURE — 87077 CULTURE AEROBIC IDENTIFY: CPT

## 2020-02-04 PROCEDURE — 82728 ASSAY OF FERRITIN: CPT

## 2020-02-04 PROCEDURE — 99395 PREV VISIT EST AGE 18-39: CPT | Performed by: FAMILY MEDICINE

## 2020-02-04 PROCEDURE — 88175 CYTOPATH C/V AUTO FLUID REDO: CPT | Performed by: FAMILY MEDICINE

## 2020-02-04 PROCEDURE — 81003 URINALYSIS AUTO W/O SCOPE: CPT

## 2020-02-04 PROCEDURE — 84443 ASSAY THYROID STIM HORMONE: CPT

## 2020-02-04 PROCEDURE — 99213 OFFICE O/P EST LOW 20 MIN: CPT | Performed by: FAMILY MEDICINE

## 2020-02-04 NOTE — PROGRESS NOTES
CHIEF COMPLAINT   Well woman exam  HPI:   Rima Garcia is a 36year old female who presents for a complete physical exam.    Last pap 8/2018. Anxiety well managed with sertraline. Needs a refill.   Anxiety increases only with mammograms     • Cyclobenzaprine HCl 5 MG Oral Tab Take 1 tablet (5 mg total) by mouth nightly. 30 tablet 5   • Ibuprofen (ADVIL) 200 MG Oral Cap Take  by mouth as needed.         Past Medical History:   Diagnosis Date   • Laboratory examination ordered as part of a rou REVIEW OF SYSTEMS:   GENERAL: feels well otherwise  SKIN: no complaint of any unusual skin lesions  EYES: no complaint of blurred vision or double vision  HEENT: no complaint of nasal congestion, sinus pain or ST  LUNGS: no complaint of shortness of br Urinalysis, Routine [E]      CBC W Differential W Platelet [E]      CMP      TSH and Free T4 [E]      Ferritin [E]      ThinPrep PAP with HPV Reflex Request      Urine Culture, Routine [E]      Meds & Refills for this Visit:  Requested Prescriptions     Si

## 2020-02-07 LAB — HPV I/H RISK 1 DNA SPEC QL NAA+PROBE: NEGATIVE

## 2020-02-07 RX ORDER — NITROFURANTOIN 25; 75 MG/1; MG/1
100 CAPSULE ORAL 2 TIMES DAILY
Qty: 14 CAPSULE | Refills: 0 | Status: SHIPPED | OUTPATIENT
Start: 2020-02-07 | End: 2020-02-14

## 2020-06-16 NOTE — TELEPHONE ENCOUNTER
To call pt to inform her of Sertraline 50 mg being on back order and changing it to 100 mg, 1/2 daily. Per pt she has received her 50 mg yesterday and no need to send the 100 mg.

## 2020-07-20 ENCOUNTER — HOSPITAL ENCOUNTER (OUTPATIENT)
Dept: MAMMOGRAPHY | Facility: HOSPITAL | Age: 35
Discharge: HOME | End: 2020-07-20
Attending: SURGERY
Payer: COMMERCIAL

## 2020-07-20 DIAGNOSIS — Z80.41 FAMILY HISTORY OF OVARIAN CANCER: ICD-10-CM

## 2020-07-20 DIAGNOSIS — D24.1 INTRADUCTAL PAPILLOMA OF RIGHT BREAST: ICD-10-CM

## 2020-07-20 DIAGNOSIS — Z80.3 FAMILY HISTORY OF BREAST CANCER: ICD-10-CM

## 2020-07-20 PROCEDURE — 77066 DX MAMMO INCL CAD BI: CPT | Performed by: SURGERY

## 2020-07-20 PROCEDURE — 76641 ULTRASOUND BREAST COMPLETE: CPT | Performed by: SURGERY

## 2020-07-20 PROCEDURE — 77062 BREAST TOMOSYNTHESIS BI: CPT | Performed by: SURGERY

## 2020-09-09 NOTE — TELEPHONE ENCOUNTER
LOV: 2/4/2020 physical, asked to return yearly  Last refill: 6/12/2020 90 tabs    Please refill if appropriate. Thank you.

## 2020-09-09 NOTE — TELEPHONE ENCOUNTER
Please call patient to schedule follow up on anxiety (can be phone or video if she prefers) then route back to me to fill medication.  Thanks
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
## 2021-04-12 ENCOUNTER — OFFICE VISIT (OUTPATIENT)
Dept: FAMILY MEDICINE CLINIC | Facility: CLINIC | Age: 36
End: 2021-04-12
Payer: COMMERCIAL

## 2021-04-12 VITALS
DIASTOLIC BLOOD PRESSURE: 68 MMHG | RESPIRATION RATE: 16 BRPM | WEIGHT: 152 LBS | HEIGHT: 64 IN | OXYGEN SATURATION: 99 % | BODY MASS INDEX: 25.95 KG/M2 | SYSTOLIC BLOOD PRESSURE: 106 MMHG | HEART RATE: 77 BPM

## 2021-04-12 DIAGNOSIS — F41.9 ANXIETY: ICD-10-CM

## 2021-04-12 DIAGNOSIS — B02.9 HERPES ZOSTER WITHOUT COMPLICATION: Primary | ICD-10-CM

## 2021-04-12 PROCEDURE — 3078F DIAST BP <80 MM HG: CPT | Performed by: FAMILY MEDICINE

## 2021-04-12 PROCEDURE — 99214 OFFICE O/P EST MOD 30 MIN: CPT | Performed by: FAMILY MEDICINE

## 2021-04-12 PROCEDURE — 3008F BODY MASS INDEX DOCD: CPT | Performed by: FAMILY MEDICINE

## 2021-04-12 PROCEDURE — 3074F SYST BP LT 130 MM HG: CPT | Performed by: FAMILY MEDICINE

## 2021-04-12 RX ORDER — VALACYCLOVIR HYDROCHLORIDE 1 G/1
TABLET, FILM COATED ORAL
Qty: 21 TABLET | Refills: 0 | Status: SHIPPED | OUTPATIENT
Start: 2021-04-12 | End: 2021-06-29

## 2021-04-12 NOTE — PROGRESS NOTES
Re Navas is a 29year old female. CHIEF COMPLAINT   Rash  HPI:   Rash on back for about a week. Using OTC hydrocortisone with some relief. Itchy and painful. Had chickenpox as a child. Pain is manageable. It was worse a few days ago.   [facility] GENERAL HEALTH: feels well otherwise    EXAM:   /68   Pulse 77   Resp 16   Ht 5' 4\" (1.626 m)   Wt 152 lb (68.9 kg)   SpO2 99%   BMI 26.09 kg/m²   GENERAL: well developed, well nourished,in no apparent distress  SKIN: rash on right back as noted bel

## 2021-05-25 ENCOUNTER — OFFICE VISIT (OUTPATIENT)
Dept: FAMILY MEDICINE CLINIC | Facility: CLINIC | Age: 36
End: 2021-05-25
Payer: COMMERCIAL

## 2021-05-25 ENCOUNTER — LAB ENCOUNTER (OUTPATIENT)
Dept: LAB | Age: 36
End: 2021-05-25
Attending: NURSE PRACTITIONER
Payer: COMMERCIAL

## 2021-05-25 VITALS
BODY MASS INDEX: 25.61 KG/M2 | SYSTOLIC BLOOD PRESSURE: 124 MMHG | WEIGHT: 150 LBS | HEIGHT: 64 IN | RESPIRATION RATE: 18 BRPM | TEMPERATURE: 97 F | DIASTOLIC BLOOD PRESSURE: 82 MMHG | HEART RATE: 76 BPM

## 2021-05-25 DIAGNOSIS — N63.15 BREAST LUMP ON RIGHT SIDE AT 12 O'CLOCK POSITION: Primary | ICD-10-CM

## 2021-05-25 DIAGNOSIS — N64.52 DISCHARGE FROM RIGHT NIPPLE: ICD-10-CM

## 2021-05-25 DIAGNOSIS — N63.24 BREAST LUMP ON LEFT SIDE AT 7 O'CLOCK POSITION: ICD-10-CM

## 2021-05-25 DIAGNOSIS — N64.52 DISCHARGE FROM LEFT NIPPLE: ICD-10-CM

## 2021-05-25 DIAGNOSIS — Z80.3 FAMILY HISTORY OF BREAST CANCER: ICD-10-CM

## 2021-05-25 PROCEDURE — 3074F SYST BP LT 130 MM HG: CPT | Performed by: NURSE PRACTITIONER

## 2021-05-25 PROCEDURE — 99214 OFFICE O/P EST MOD 30 MIN: CPT | Performed by: NURSE PRACTITIONER

## 2021-05-25 PROCEDURE — 3079F DIAST BP 80-89 MM HG: CPT | Performed by: NURSE PRACTITIONER

## 2021-05-25 PROCEDURE — 84146 ASSAY OF PROLACTIN: CPT | Performed by: NURSE PRACTITIONER

## 2021-05-25 PROCEDURE — 3008F BODY MASS INDEX DOCD: CPT | Performed by: NURSE PRACTITIONER

## 2021-05-25 NOTE — PROGRESS NOTES
Sammie Kaur is a 29year old female. HPI:   Patient presents today reporting a right sided breast lump she noticed earlier this morning.  She has been having some milky discharge from the right nipple and clear discharge from the left nipple last DIRECTED 270 capsule 3   • topiramate 100 MG Oral Tab Take 1.5 tablets (150 mg total) by mouth daily. 135 tablet 3   • Ibuprofen (ADVIL) 200 MG Oral Cap Take  by mouth as needed.         Past Medical History:   Diagnosis Date   • Laboratory examination orde No nipple dimpling or discharge.   EXTREMITIES: no cyanosis, clubbing or edema  Musculoskeletal: No gross deficit  Neurological: nerves II through XII grossly intact no sensorimotor deficit  Psychological: Mood and affect are normal.  Good communication ski

## 2021-05-26 ENCOUNTER — HOSPITAL ENCOUNTER (OUTPATIENT)
Dept: MAMMOGRAPHY | Facility: HOSPITAL | Age: 36
Discharge: HOME | End: 2021-05-26
Attending: NURSE PRACTITIONER
Payer: COMMERCIAL

## 2021-05-26 DIAGNOSIS — N63.15 BREAST LUMP ON RIGHT SIDE AT 12 O'CLOCK POSITION: ICD-10-CM

## 2021-05-26 DIAGNOSIS — N63.24 BREAST LUMP ON LEFT SIDE AT 7 O'CLOCK POSITION: ICD-10-CM

## 2021-05-26 DIAGNOSIS — N64.52 DISCHARGE FROM RIGHT NIPPLE: ICD-10-CM

## 2021-05-26 PROCEDURE — 77062 BREAST TOMOSYNTHESIS BI: CPT | Performed by: NURSE PRACTITIONER

## 2021-05-26 PROCEDURE — 76642 ULTRASOUND BREAST LIMITED: CPT | Performed by: NURSE PRACTITIONER

## 2021-05-26 PROCEDURE — 77066 DX MAMMO INCL CAD BI: CPT | Performed by: NURSE PRACTITIONER

## 2021-05-27 ENCOUNTER — TELEPHONE (OUTPATIENT)
Dept: SURGERY | Facility: CLINIC | Age: 36
End: 2021-05-27

## 2021-05-27 NOTE — TELEPHONE ENCOUNTER
----- Message from Katelyn Davis MD sent at 5/26/2021  4:31 PM CDT -----  Can you order the breast MRI and let the patient know? Aside from what is mentioned below, she also has a family history of breast cancer.     Thanks,  JOYCE  ----- Message ----

## 2021-05-27 NOTE — TELEPHONE ENCOUNTER
MRI already placed by Nida Martin states we do not need to order anymore. Pt to follow up once MRI is completed.

## 2021-05-27 NOTE — TELEPHONE ENCOUNTER
----- Message from Dann Bullard MD sent at 5/26/2021  4:31 PM CDT -----  Can you order the breast MRI and let the patient know? Aside from what is mentioned below, she also has a family history of breast cancer.     Thanks,  JOYCE  ----- Message ----

## 2021-06-09 ENCOUNTER — TELEPHONE (OUTPATIENT)
Dept: FAMILY MEDICINE CLINIC | Facility: CLINIC | Age: 36
End: 2021-06-09

## 2021-06-09 NOTE — TELEPHONE ENCOUNTER
Letter of Denial for MRI Breast (w/wo) w/ CAD Bilat from [facility].     Member ID: [id number]    Reference #: [id number]    Patient scheduled to have MRI performed tomorrow, 6/10/21 at 3:45 pm.    I called [facility] [place]--> I

## 2021-06-10 ENCOUNTER — HOSPITAL ENCOUNTER (OUTPATIENT)
Dept: MRI IMAGING | Age: 36
Discharge: HOME | End: 2021-06-10
Attending: NURSE PRACTITIONER
Payer: COMMERCIAL

## 2021-06-10 DIAGNOSIS — N63.15 BREAST LUMP ON RIGHT SIDE AT 12 O'CLOCK POSITION: ICD-10-CM

## 2021-06-10 DIAGNOSIS — N64.52 DISCHARGE FROM LEFT NIPPLE: ICD-10-CM

## 2021-06-10 DIAGNOSIS — R92.8 ABNORMAL MAMMOGRAM: ICD-10-CM

## 2021-06-10 DIAGNOSIS — N64.52 DISCHARGE FROM RIGHT NIPPLE: ICD-10-CM

## 2021-06-10 DIAGNOSIS — D24.1 INTRADUCTAL PAPILLOMA OF RIGHT BREAST: ICD-10-CM

## 2021-06-10 PROCEDURE — 77049 MRI BREAST C-+ W/CAD BI: CPT | Performed by: NURSE PRACTITIONER

## 2021-06-10 PROCEDURE — A9575 INJ GADOTERATE MEGLUMI 0.1ML: HCPCS | Performed by: NURSE PRACTITIONER

## 2021-06-14 ENCOUNTER — TELEPHONE (OUTPATIENT)
Dept: FAMILY MEDICINE CLINIC | Facility: CLINIC | Age: 36
End: 2021-06-14

## 2021-06-14 NOTE — TELEPHONE ENCOUNTER
Patient is wanting to know if its ok for her to receive COVID vaccine. Patient states she was told to hold off on vaccine until she completes mammogram and other imaging. Patient is still waiting to see if breast surgeon will want to do a biopsy.  Please ca

## 2021-06-14 NOTE — TELEPHONE ENCOUNTER
I don't see a problem with it since she is done with her imaging. She can check with her surgeon also to ensure she is ok with it.

## 2021-06-29 ENCOUNTER — OFFICE VISIT (OUTPATIENT)
Dept: HEMATOLOGY/ONCOLOGY | Facility: HOSPITAL | Age: 36
End: 2021-06-29
Attending: SURGERY
Payer: COMMERCIAL

## 2021-06-29 ENCOUNTER — OFFICE VISIT (OUTPATIENT)
Dept: SURGERY | Facility: CLINIC | Age: 36
End: 2021-06-29
Payer: COMMERCIAL

## 2021-06-29 VITALS
WEIGHT: 153 LBS | DIASTOLIC BLOOD PRESSURE: 78 MMHG | RESPIRATION RATE: 16 BRPM | HEART RATE: 88 BPM | OXYGEN SATURATION: 100 % | BODY MASS INDEX: 26 KG/M2 | TEMPERATURE: 98 F | SYSTOLIC BLOOD PRESSURE: 117 MMHG

## 2021-06-29 DIAGNOSIS — D24.1 INTRADUCTAL PAPILLOMA OF RIGHT BREAST: ICD-10-CM

## 2021-06-29 DIAGNOSIS — N64.52 NIPPLE DISCHARGE IN FEMALE: Primary | ICD-10-CM

## 2021-06-29 PROCEDURE — 99211 OFF/OP EST MAY X REQ PHY/QHP: CPT

## 2021-06-29 PROCEDURE — 99213 OFFICE O/P EST LOW 20 MIN: CPT | Performed by: SURGERY

## 2021-06-29 NOTE — PROGRESS NOTES
Patient presents to clinic for follow up mammogram imaging. No complaints at this time. Medication and allergies reviewed.

## 2021-07-01 NOTE — PROGRESS NOTES
HPI/Subjective:   Patient ID: Khushbu Aquino is a 29year old female who underwent an excision of a right breast papilloma, performed August 26, 2016.  The papilloma was found after David-Cut biopsy of a right breast mass.  The mass was not apparent on mg total) by mouth daily. 90 tablet 1   • Butalbital-APAP-Caffeine -40 MG Oral Tab Take 1 tablet by mouth every 4 (four) hours as needed for Pain.  30 tablet 2   • gabapentin 100 MG Oral Cap TAKE 3 CAPSULES BY MOUTH  NIGHTLY AS DIRECTED 270 capsule 3 excisional biopsy.  No new suspicious subjacent mammographic abnormality is seen.     This will be further evaluated with ultrasound.  There are no suspicious mammographic findings in the subareolar breast in the magnification views.  Given nipple discharg Targeted ultrasound of the retroareolar left breast demonstrates normal appearing breast tissue as well as an oval circumscribed minimally hypoechoic mass versus cyst versus possible portion of a duct measuring 0.3 x 0.2 x 0.2 cm at the 0900 hours   retroa patient.  This exam was evaluated with a computer-aided device.  This patient's information has been entered into a reminder system with a target due date for the next mammogram.               Dictated by (CST): Anisha Heredia MD on 5/26/2021        MRI Br retroareolar position.  No concerning correlate was identified on    MRI.       5Given the patient's family history,  recommend consideration of continued annual supplemental screening with MRI.       BI-RADS CATEGORY:   DIAGNOSTIC CATEGORY 2--BENIGN FINDI

## 2021-07-08 ENCOUNTER — PATIENT OUTREACH (OUTPATIENT)
Dept: SURGERY | Facility: CLINIC | Age: 36
End: 2021-07-08

## 2021-10-28 NOTE — TELEPHONE ENCOUNTER
LOV: 5/25/21 (acute) 4/12/21 (anxiety)  Last Refill: 10/2/21, 30 days, 0 RF  Next OV: N/A    Please authorize if acceptable. Thank you!

## 2021-12-03 NOTE — TELEPHONE ENCOUNTER
[facility]ax rec'd from [facility].  Sts per insurance needs to be 90 day  We sent #[phone] yesterday. Has appt with you 12.13  I pended 90 if you agree?

## 2021-12-13 NOTE — PROGRESS NOTES
Art Pap is a 29year old female. CHIEF COMPLAINT   Follow-up on anxiety  HPI:   Doing well with sertraline - started a couple years ago for anxiety. Doing well with sertraline 50 mg. No history of depression. Moods are good.   Motivation is Pulse 84   Temp 98.8 °F (37.1 °C)   Resp 16   Ht 5' 4\" (1.626 m)   Wt 146 lb (66.2 kg)   BMI 25.06 kg/m²   GENERAL: well developed, well nourished,in no apparent distress  SKIN: no rashes,no suspicious lesions  PSYCH: Normal affect. Normal mood.     ASSES

## 2021-12-14 ENCOUNTER — HOSPITAL ENCOUNTER (OUTPATIENT)
Dept: MAMMOGRAPHY | Facility: HOSPITAL | Age: 36
Discharge: HOME | End: 2021-12-14
Attending: SURGERY
Payer: COMMERCIAL

## 2021-12-14 DIAGNOSIS — N64.52 NIPPLE DISCHARGE IN FEMALE: ICD-10-CM

## 2021-12-14 PROCEDURE — 76642 ULTRASOUND BREAST LIMITED: CPT | Performed by: SURGERY

## 2021-12-14 NOTE — IMAGING NOTE
Dakota Torres is recommended for an ultrasound guided biopsy of the left breast x 1 site by Benita Stokes.      History:   Nipple discharge in female       28-year-old woman  with strong family history of breast malignancy presents for follow-up imaging of t Medications and allergies reviewed: patient reported allergy to steri strips  Current Outpatient Medications   Medication Sig Dispense Refill   • CYCLOBENZAPRINE 5 MG Oral Tab TAKE 1 TABLET BY MOUTH EVERY DAY AT NIGHT 30 tablet 0   • sertraline 50 MG

## 2021-12-15 ENCOUNTER — TELEPHONE (OUTPATIENT)
Dept: SURGERY | Facility: CLINIC | Age: 36
End: 2021-12-15

## 2021-12-15 NOTE — TELEPHONE ENCOUNTER
Leslie Solis MD  P Emg Gen Surg Nurse  OK to order the US-guided left breast biopsy. Faxed ordered and notified patient.

## 2021-12-29 ENCOUNTER — HOSPITAL ENCOUNTER (OUTPATIENT)
Dept: MAMMOGRAPHY | Facility: HOSPITAL | Age: 36
Discharge: HOME | End: 2021-12-29
Attending: SURGERY
Payer: COMMERCIAL

## 2021-12-29 DIAGNOSIS — R92.2 INCONCLUSIVE MAMMOGRAM: ICD-10-CM

## 2021-12-29 PROCEDURE — 88305 TISSUE EXAM BY PATHOLOGIST: CPT | Performed by: SURGERY

## 2021-12-29 PROCEDURE — 19083 BX BREAST 1ST LESION US IMAG: CPT | Performed by: SURGERY

## 2021-12-29 PROCEDURE — 77065 DX MAMMO INCL CAD UNI: CPT | Performed by: SURGERY

## 2022-01-11 ENCOUNTER — OFFICE VISIT (OUTPATIENT)
Dept: HEMATOLOGY/ONCOLOGY | Facility: HOSPITAL | Age: 37
End: 2022-01-11
Attending: SURGERY
Payer: COMMERCIAL

## 2022-01-11 ENCOUNTER — OFFICE VISIT (OUTPATIENT)
Dept: SURGERY | Facility: CLINIC | Age: 37
End: 2022-01-11
Payer: COMMERCIAL

## 2022-01-11 VITALS
RESPIRATION RATE: 16 BRPM | SYSTOLIC BLOOD PRESSURE: 125 MMHG | HEART RATE: 76 BPM | TEMPERATURE: 98 F | DIASTOLIC BLOOD PRESSURE: 81 MMHG | BODY MASS INDEX: 25 KG/M2 | WEIGHT: 148 LBS | OXYGEN SATURATION: 100 %

## 2022-01-11 DIAGNOSIS — D24.2 INTRADUCTAL PAPILLOMA OF LEFT BREAST: Primary | ICD-10-CM

## 2022-01-11 DIAGNOSIS — D24.1 INTRADUCTAL PAPILLOMA OF RIGHT BREAST: ICD-10-CM

## 2022-01-11 DIAGNOSIS — Z80.3 FAMILY HISTORY OF BREAST CANCER: ICD-10-CM

## 2022-01-11 PROCEDURE — 99214 OFFICE O/P EST MOD 30 MIN: CPT | Performed by: SURGERY

## 2022-01-11 NOTE — PROGRESS NOTES
Patient presents to clinic for breast follow up. Ultrasound of LFT breast performed on 12/14/21 biopsy recommended. LFT breast biopsy performed on 12/29/21 pathology is reported as benign but shows the presence of major duct intraductal papilloma. Denies any pain or discomfort. Last time patient noticed nipple discharge was 4 days ago clear milky discharge. Medication and allergies reviewed and updated.

## 2022-01-11 NOTE — PROGRESS NOTES
Subjective:   Patient ID: Grzegorz Webb is a 29year old female who underwent an excision of a right breast papilloma, performed August 26, 2016.  The papilloma was found after David-Cut biopsy of a right breast mass.  The mass was not apparent on mic Butalbital-APAP-Caffeine -40 MG Oral Tab Take 1 tablet by mouth every 4 (four) hours as needed for Pain. 30 tablet 2     Allergies:   Other                   ITCHING    Comment:Steri strip    Objective:   Physical Exam  Vitals and nursing note reviewe symptoms, ultrasound-guided biopsy is   recommended.                          Impression   CONCLUSION:     Indeterminate dilated duct versus intraductal mass at the 0900 hours retroareolar position of the left breast.  The patient is having ongoing left cl

## 2022-02-22 ENCOUNTER — OFFICE VISIT (OUTPATIENT)
Dept: SURGERY | Facility: CLINIC | Age: 37
End: 2022-02-22
Payer: COMMERCIAL

## 2022-02-22 VITALS
DIASTOLIC BLOOD PRESSURE: 79 MMHG | HEART RATE: 85 BPM | WEIGHT: 149 LBS | OXYGEN SATURATION: 99 % | RESPIRATION RATE: 16 BRPM | SYSTOLIC BLOOD PRESSURE: 123 MMHG | BODY MASS INDEX: 25.44 KG/M2 | HEIGHT: 64.3 IN

## 2022-02-22 DIAGNOSIS — D24.2 INTRADUCTAL PAPILLOMA OF LEFT BREAST: ICD-10-CM

## 2022-02-22 DIAGNOSIS — D24.1 INTRADUCTAL PAPILLOMA OF RIGHT BREAST: Primary | ICD-10-CM

## 2022-02-22 PROCEDURE — 99243 OFF/OP CNSLTJ NEW/EST LOW 30: CPT | Performed by: SURGERY

## 2022-02-22 PROCEDURE — 3078F DIAST BP <80 MM HG: CPT | Performed by: SURGERY

## 2022-02-22 PROCEDURE — 3008F BODY MASS INDEX DOCD: CPT | Performed by: SURGERY

## 2022-02-22 PROCEDURE — 3074F SYST BP LT 130 MM HG: CPT | Performed by: SURGERY

## 2022-02-24 ENCOUNTER — TELEPHONE (OUTPATIENT)
Dept: SURGERY | Facility: CLINIC | Age: 37
End: 2022-02-24

## 2022-02-24 NOTE — TELEPHONE ENCOUNTER
Patient was called and offered surgery Date 4/14/2022. Patient excepted. Pt reminded to complete medical clearance. Pt verbalized understanding.

## 2022-03-10 ENCOUNTER — TELEPHONE (OUTPATIENT)
Dept: SURGERY | Facility: CLINIC | Age: 37
End: 2022-03-10

## 2022-03-24 ENCOUNTER — OFFICE VISIT (OUTPATIENT)
Dept: FAMILY MEDICINE CLINIC | Facility: CLINIC | Age: 37
End: 2022-03-24
Payer: COMMERCIAL

## 2022-03-24 ENCOUNTER — LAB ENCOUNTER (OUTPATIENT)
Dept: LAB | Age: 37
End: 2022-03-24
Attending: FAMILY MEDICINE
Payer: COMMERCIAL

## 2022-03-24 VITALS
BODY MASS INDEX: 26.29 KG/M2 | WEIGHT: 154 LBS | RESPIRATION RATE: 18 BRPM | DIASTOLIC BLOOD PRESSURE: 60 MMHG | HEIGHT: 64 IN | TEMPERATURE: 98 F | SYSTOLIC BLOOD PRESSURE: 102 MMHG | HEART RATE: 72 BPM

## 2022-03-24 DIAGNOSIS — N64.52 NIPPLE DISCHARGE IN FEMALE: ICD-10-CM

## 2022-03-24 DIAGNOSIS — Z01.818 PRE-OP TESTING: ICD-10-CM

## 2022-03-24 DIAGNOSIS — D24.2 INTRADUCTAL PAPILLOMA OF LEFT BREAST: ICD-10-CM

## 2022-03-24 DIAGNOSIS — Z01.818 PRE-OPERATIVE CLEARANCE: Primary | ICD-10-CM

## 2022-03-24 DIAGNOSIS — D24.1 INTRADUCTAL PAPILLOMA OF RIGHT BREAST: ICD-10-CM

## 2022-03-24 LAB
ALBUMIN SERPL-MCNC: 4.2 G/DL (ref 3.4–5)
ALBUMIN/GLOB SERPL: 1.8 {RATIO} (ref 1–2)
ALP LIVER SERPL-CCNC: 42 U/L
ALT SERPL-CCNC: 28 U/L
ANION GAP SERPL CALC-SCNC: 3 MMOL/L (ref 0–18)
AST SERPL-CCNC: 16 U/L (ref 15–37)
BASOPHILS # BLD AUTO: 0.02 X10(3) UL (ref 0–0.2)
BASOPHILS NFR BLD AUTO: 0.5 %
BILIRUB SERPL-MCNC: 0.6 MG/DL (ref 0.1–2)
BUN BLD-MCNC: 15 MG/DL (ref 7–18)
CALCIUM BLD-MCNC: 9 MG/DL (ref 8.5–10.1)
CHLORIDE SERPL-SCNC: 112 MMOL/L (ref 98–112)
CO2 SERPL-SCNC: 25 MMOL/L (ref 21–32)
CREAT BLD-MCNC: 0.89 MG/DL
EOSINOPHIL # BLD AUTO: 0.07 X10(3) UL (ref 0–0.7)
EOSINOPHIL NFR BLD AUTO: 1.9 %
ERYTHROCYTE [DISTWIDTH] IN BLOOD BY AUTOMATED COUNT: 12.9 %
FASTING STATUS PATIENT QL REPORTED: YES
GLOBULIN PLAS-MCNC: 2.4 G/DL (ref 2.8–4.4)
GLUCOSE BLD-MCNC: 88 MG/DL (ref 70–99)
HCT VFR BLD AUTO: 44.6 %
HGB BLD-MCNC: 15.2 G/DL
IMM GRANULOCYTES # BLD AUTO: 0.01 X10(3) UL (ref 0–1)
IMM GRANULOCYTES NFR BLD: 0.3 %
LH SERPL-ACNC: 5.9 MIU/ML
LYMPHOCYTES # BLD AUTO: 1.25 X10(3) UL (ref 1–4)
LYMPHOCYTES NFR BLD AUTO: 33.2 %
MCH RBC QN AUTO: 31.4 PG (ref 26–34)
MCHC RBC AUTO-ENTMCNC: 34.1 G/DL (ref 31–37)
MCV RBC AUTO: 92.1 FL
MONOCYTES # BLD AUTO: 0.3 X10(3) UL (ref 0.1–1)
MONOCYTES NFR BLD AUTO: 8 %
NEUTROPHILS # BLD AUTO: 2.11 X10 (3) UL (ref 1.5–7.7)
NEUTROPHILS # BLD AUTO: 2.11 X10(3) UL (ref 1.5–7.7)
OSMOLALITY SERPL CALC.SUM OF ELEC: 290 MOSM/KG (ref 275–295)
PLATELET # BLD AUTO: 201 10(3)UL (ref 150–450)
POTASSIUM SERPL-SCNC: 4.5 MMOL/L (ref 3.5–5.1)
PROT SERPL-MCNC: 6.6 G/DL (ref 6.4–8.2)
RBC # BLD AUTO: 4.84 X10(6)UL
SODIUM SERPL-SCNC: 140 MMOL/L (ref 136–145)
TSI SER-ACNC: 2.33 MIU/ML (ref 0.36–3.74)
WBC # BLD AUTO: 3.8 X10(3) UL (ref 4–11)

## 2022-03-24 PROCEDURE — 84403 ASSAY OF TOTAL TESTOSTERONE: CPT | Performed by: SURGERY

## 2022-03-24 PROCEDURE — 84704 HCG FREE BETACHAIN TEST: CPT | Performed by: SURGERY

## 2022-03-24 PROCEDURE — 80053 COMPREHEN METABOLIC PANEL: CPT | Performed by: SURGERY

## 2022-03-24 PROCEDURE — 84402 ASSAY OF FREE TESTOSTERONE: CPT | Performed by: SURGERY

## 2022-03-24 PROCEDURE — 3074F SYST BP LT 130 MM HG: CPT | Performed by: FAMILY MEDICINE

## 2022-03-24 PROCEDURE — 84443 ASSAY THYROID STIM HORMONE: CPT | Performed by: SURGERY

## 2022-03-24 PROCEDURE — 82679 ASSAY OF ESTRONE: CPT | Performed by: SURGERY

## 2022-03-24 PROCEDURE — 99241 OFFICE CONSULTATION,LEVEL I: CPT | Performed by: FAMILY MEDICINE

## 2022-03-24 PROCEDURE — 85025 COMPLETE CBC W/AUTO DIFF WBC: CPT | Performed by: FAMILY MEDICINE

## 2022-03-24 PROCEDURE — 3008F BODY MASS INDEX DOCD: CPT | Performed by: FAMILY MEDICINE

## 2022-03-24 PROCEDURE — 83002 ASSAY OF GONADOTROPIN (LH): CPT | Performed by: SURGERY

## 2022-03-24 PROCEDURE — 3078F DIAST BP <80 MM HG: CPT | Performed by: FAMILY MEDICINE

## 2022-03-24 RX ORDER — CYCLOBENZAPRINE HCL 5 MG
5 TABLET ORAL NIGHTLY
Qty: 30 TABLET | Refills: 0 | Status: SHIPPED | OUTPATIENT
Start: 2022-03-24

## 2022-03-25 ENCOUNTER — TELEPHONE (OUTPATIENT)
Dept: FAMILY MEDICINE CLINIC | Facility: CLINIC | Age: 37
End: 2022-03-25

## 2022-03-25 LAB — BETA HCG QUANT (TUMOR MARKER): <1 IU/L

## 2022-03-25 NOTE — TELEPHONE ENCOUNTER
Faxed completed pre-op H&P and CMP/CBC lab results to [address] [place] [address] at [phone] for patient's surgery on 4/14/22. Confirmation received.

## 2022-04-03 LAB
ESTRONE BY TMS: 33.3 PG/ML
SEX HORMONE BINDING GLOBULIN: 75 NMOL/L
TESTOSTERONE -MS, BIOAVAILAB: 10.3 NG/DL
TESTOSTERONE, -MS/MS: 36 NG/DL
TESTOSTERONE, FREE -MS/MS: 3.5 PG/ML

## 2022-04-11 ENCOUNTER — LAB ENCOUNTER (OUTPATIENT)
Dept: LAB | Age: 37
End: 2022-04-11
Attending: SURGERY
Payer: COMMERCIAL

## 2022-04-11 DIAGNOSIS — Z01.812 ENCOUNTER FOR PREOPERATIVE SCREENING LABORATORY TESTING FOR COVID-19 VIRUS: ICD-10-CM

## 2022-04-11 DIAGNOSIS — Z20.822 ENCOUNTER FOR PREOPERATIVE SCREENING LABORATORY TESTING FOR COVID-19 VIRUS: ICD-10-CM

## 2022-04-12 LAB — SARS-COV-2 RNA RESP QL NAA+PROBE: NOT DETECTED

## 2022-04-13 ENCOUNTER — ANESTHESIA EVENT (OUTPATIENT)
Dept: SURGERY | Facility: HOSPITAL | Age: 37
End: 2022-04-13
Payer: COMMERCIAL

## 2022-04-14 ENCOUNTER — ANESTHESIA (OUTPATIENT)
Dept: SURGERY | Facility: HOSPITAL | Age: 37
End: 2022-04-14
Payer: COMMERCIAL

## 2022-04-14 ENCOUNTER — HOSPITAL ENCOUNTER (OUTPATIENT)
Facility: HOSPITAL | Age: 37
Setting detail: HOSPITAL OUTPATIENT SURGERY
Discharge: HOME | End: 2022-04-14
Attending: SURGERY | Admitting: SURGERY
Payer: COMMERCIAL

## 2022-04-14 VITALS
BODY MASS INDEX: 26.54 KG/M2 | WEIGHT: 155.44 LBS | SYSTOLIC BLOOD PRESSURE: 140 MMHG | OXYGEN SATURATION: 100 % | RESPIRATION RATE: 14 BRPM | HEART RATE: 77 BPM | DIASTOLIC BLOOD PRESSURE: 82 MMHG | TEMPERATURE: 99 F | HEIGHT: 64 IN

## 2022-04-14 DIAGNOSIS — D24.2 INTRADUCTAL PAPILLOMA OF LEFT BREAST: ICD-10-CM

## 2022-04-14 DIAGNOSIS — Z01.812 ENCOUNTER FOR PREOPERATIVE SCREENING LABORATORY TESTING FOR COVID-19 VIRUS: Primary | ICD-10-CM

## 2022-04-14 DIAGNOSIS — D24.1 INTRADUCTAL PAPILLOMA OF RIGHT BREAST: ICD-10-CM

## 2022-04-14 DIAGNOSIS — Z20.822 ENCOUNTER FOR PREOPERATIVE SCREENING LABORATORY TESTING FOR COVID-19 VIRUS: Primary | ICD-10-CM

## 2022-04-14 LAB — B-HCG UR QL: NEGATIVE

## 2022-04-14 PROCEDURE — 88307 TISSUE EXAM BY PATHOLOGIST: CPT | Performed by: SURGERY

## 2022-04-14 PROCEDURE — 81025 URINE PREGNANCY TEST: CPT

## 2022-04-14 PROCEDURE — 0HTV0ZZ RESECTION OF BILATERAL BREAST, OPEN APPROACH: ICD-10-PCS | Performed by: SURGERY

## 2022-04-14 PROCEDURE — 0HHV0NZ INSERTION OF TISSUE EXPANDER INTO BILATERAL BREAST, OPEN APPROACH: ICD-10-PCS | Performed by: SURGERY

## 2022-04-14 PROCEDURE — 76942 ECHO GUIDE FOR BIOPSY: CPT | Performed by: ANESTHESIOLOGY

## 2022-04-14 DEVICE — BREAST TISSUE EXPANDER, SUTURE TABS, INTEGRAL INJECTION DOME, 475CC
Type: IMPLANTABLE DEVICE | Site: BREAST | Status: FUNCTIONAL
Brand: MENTOR ARTOURA PLUS, SMOOTH, HIGH PROFILE

## 2022-04-14 DEVICE — MATRIX ALLODERM SELECT: Type: IMPLANTABLE DEVICE | Site: BREAST | Status: FUNCTIONAL

## 2022-04-14 RX ORDER — SODIUM CHLORIDE, SODIUM LACTATE, POTASSIUM CHLORIDE, CALCIUM CHLORIDE 600; 310; 30; 20 MG/100ML; MG/100ML; MG/100ML; MG/100ML
INJECTION, SOLUTION INTRAVENOUS CONTINUOUS
Status: DISCONTINUED | OUTPATIENT
Start: 2022-04-14 | End: 2022-04-14

## 2022-04-14 RX ORDER — KETAMINE HYDROCHLORIDE 50 MG/ML
INJECTION, SOLUTION, CONCENTRATE INTRAMUSCULAR; INTRAVENOUS AS NEEDED
Status: DISCONTINUED | OUTPATIENT
Start: 2022-04-14 | End: 2022-04-14 | Stop reason: SURG

## 2022-04-14 RX ORDER — ONDANSETRON 4 MG/1
4 TABLET, FILM COATED ORAL EVERY 8 HOURS PRN
Qty: 30 TABLET | Refills: 0 | Status: SHIPPED | OUTPATIENT
Start: 2022-04-14

## 2022-04-14 RX ORDER — HYDROCODONE BITARTRATE AND ACETAMINOPHEN 5; 325 MG/1; MG/1
1 TABLET ORAL AS NEEDED
Status: COMPLETED | OUTPATIENT
Start: 2022-04-14 | End: 2022-04-14

## 2022-04-14 RX ORDER — ACETAMINOPHEN 500 MG
1000 TABLET ORAL ONCE AS NEEDED
Status: DISCONTINUED | OUTPATIENT
Start: 2022-04-14 | End: 2022-04-14

## 2022-04-14 RX ORDER — ROCURONIUM BROMIDE 10 MG/ML
INJECTION, SOLUTION INTRAVENOUS AS NEEDED
Status: DISCONTINUED | OUTPATIENT
Start: 2022-04-14 | End: 2022-04-14 | Stop reason: SURG

## 2022-04-14 RX ORDER — NALOXONE HYDROCHLORIDE 0.4 MG/ML
80 INJECTION, SOLUTION INTRAMUSCULAR; INTRAVENOUS; SUBCUTANEOUS AS NEEDED
Status: ACTIVE | OUTPATIENT
Start: 2022-04-14 | End: 2022-04-14

## 2022-04-14 RX ORDER — CEPHALEXIN 500 MG/1
500 CAPSULE ORAL 4 TIMES DAILY
Qty: 40 CAPSULE | Refills: 2 | Status: SHIPPED | OUTPATIENT
Start: 2022-04-14 | End: 2022-04-24

## 2022-04-14 RX ORDER — MEPERIDINE HYDROCHLORIDE 25 MG/ML
INJECTION INTRAMUSCULAR; INTRAVENOUS; SUBCUTANEOUS
Status: COMPLETED
Start: 2022-04-14 | End: 2022-04-14

## 2022-04-14 RX ORDER — OXYCODONE HYDROCHLORIDE 5 MG/1
5 TABLET ORAL EVERY 4 HOURS PRN
Status: DISCONTINUED | OUTPATIENT
Start: 2022-04-14 | End: 2022-04-14

## 2022-04-14 RX ORDER — METOCLOPRAMIDE HYDROCHLORIDE 5 MG/ML
INJECTION INTRAMUSCULAR; INTRAVENOUS AS NEEDED
Status: DISCONTINUED | OUTPATIENT
Start: 2022-04-14 | End: 2022-04-14 | Stop reason: SURG

## 2022-04-14 RX ORDER — ONDANSETRON 2 MG/ML
4 INJECTION INTRAMUSCULAR; INTRAVENOUS AS NEEDED
Status: ACTIVE | OUTPATIENT
Start: 2022-04-14 | End: 2022-04-14

## 2022-04-14 RX ORDER — DOCUSATE SODIUM 100 MG/1
100 CAPSULE, LIQUID FILLED ORAL 2 TIMES DAILY
Qty: 40 CAPSULE | Refills: 0 | Status: SHIPPED | OUTPATIENT
Start: 2022-04-14

## 2022-04-14 RX ORDER — LIDOCAINE HYDROCHLORIDE 10 MG/ML
INJECTION, SOLUTION EPIDURAL; INFILTRATION; INTRACAUDAL; PERINEURAL AS NEEDED
Status: DISCONTINUED | OUTPATIENT
Start: 2022-04-14 | End: 2022-04-14 | Stop reason: SURG

## 2022-04-14 RX ORDER — HYDROCODONE BITARTRATE AND ACETAMINOPHEN 5; 325 MG/1; MG/1
2 TABLET ORAL AS NEEDED
Status: COMPLETED | OUTPATIENT
Start: 2022-04-14 | End: 2022-04-14

## 2022-04-14 RX ORDER — HYDROMORPHONE HYDROCHLORIDE 1 MG/ML
INJECTION, SOLUTION INTRAMUSCULAR; INTRAVENOUS; SUBCUTANEOUS
Status: COMPLETED
Start: 2022-04-14 | End: 2022-04-14

## 2022-04-14 RX ORDER — LABETALOL HYDROCHLORIDE 5 MG/ML
5 INJECTION, SOLUTION INTRAVENOUS EVERY 5 MIN PRN
Status: ACTIVE | OUTPATIENT
Start: 2022-04-14 | End: 2022-04-14

## 2022-04-14 RX ORDER — CEFAZOLIN SODIUM/WATER 2 G/20 ML
2 SYRINGE (ML) INTRAVENOUS ONCE
Status: COMPLETED | OUTPATIENT
Start: 2022-04-14 | End: 2022-04-14

## 2022-04-14 RX ORDER — ACETAMINOPHEN 500 MG
1000 TABLET ORAL ONCE
Status: DISCONTINUED | OUTPATIENT
Start: 2022-04-14 | End: 2022-04-14 | Stop reason: HOSPADM

## 2022-04-14 RX ORDER — HYDROMORPHONE HYDROCHLORIDE 1 MG/ML
0.4 INJECTION, SOLUTION INTRAMUSCULAR; INTRAVENOUS; SUBCUTANEOUS EVERY 5 MIN PRN
Status: ACTIVE | OUTPATIENT
Start: 2022-04-14 | End: 2022-04-14

## 2022-04-14 RX ORDER — HYDROCODONE BITARTRATE AND ACETAMINOPHEN 5; 325 MG/1; MG/1
1-2 TABLET ORAL EVERY 4 HOURS PRN
Qty: 40 TABLET | Refills: 0 | Status: SHIPPED | OUTPATIENT
Start: 2022-04-14 | End: 2022-04-19

## 2022-04-14 RX ORDER — DEXAMETHASONE SODIUM PHOSPHATE 4 MG/ML
VIAL (ML) INJECTION AS NEEDED
Status: DISCONTINUED | OUTPATIENT
Start: 2022-04-14 | End: 2022-04-14 | Stop reason: SURG

## 2022-04-14 RX ORDER — ONDANSETRON 2 MG/ML
INJECTION INTRAMUSCULAR; INTRAVENOUS AS NEEDED
Status: DISCONTINUED | OUTPATIENT
Start: 2022-04-14 | End: 2022-04-14 | Stop reason: SURG

## 2022-04-14 RX ORDER — MIDAZOLAM HYDROCHLORIDE 1 MG/ML
1 INJECTION INTRAMUSCULAR; INTRAVENOUS EVERY 5 MIN PRN
Status: ACTIVE | OUTPATIENT
Start: 2022-04-14 | End: 2022-04-14

## 2022-04-14 RX ORDER — METOCLOPRAMIDE HYDROCHLORIDE 5 MG/ML
10 INJECTION INTRAMUSCULAR; INTRAVENOUS AS NEEDED
Status: ACTIVE | OUTPATIENT
Start: 2022-04-14 | End: 2022-04-14

## 2022-04-14 RX ORDER — MEPERIDINE HYDROCHLORIDE 25 MG/ML
12.5 INJECTION INTRAMUSCULAR; INTRAVENOUS; SUBCUTANEOUS AS NEEDED
Status: DISCONTINUED | OUTPATIENT
Start: 2022-04-14 | End: 2022-04-14

## 2022-04-14 RX ORDER — OXYCODONE HYDROCHLORIDE 5 MG/1
TABLET ORAL
Status: COMPLETED
Start: 2022-04-14 | End: 2022-04-14

## 2022-04-14 RX ADMIN — ONDANSETRON 4 MG: 2 INJECTION INTRAMUSCULAR; INTRAVENOUS at 10:34:00

## 2022-04-14 RX ADMIN — DEXAMETHASONE SODIUM PHOSPHATE 8 MG: 4 MG/ML VIAL (ML) INJECTION at 07:30:00

## 2022-04-14 RX ADMIN — SODIUM CHLORIDE, SODIUM LACTATE, POTASSIUM CHLORIDE, CALCIUM CHLORIDE: 600; 310; 30; 20 INJECTION, SOLUTION INTRAVENOUS at 09:42:00

## 2022-04-14 RX ADMIN — ROCURONIUM BROMIDE 10 MG: 10 INJECTION, SOLUTION INTRAVENOUS at 10:08:00

## 2022-04-14 RX ADMIN — ROCURONIUM BROMIDE 10 MG: 10 INJECTION, SOLUTION INTRAVENOUS at 08:27:00

## 2022-04-14 RX ADMIN — METOCLOPRAMIDE HYDROCHLORIDE 10 MG: 5 INJECTION INTRAMUSCULAR; INTRAVENOUS at 10:34:00

## 2022-04-14 RX ADMIN — ROCURONIUM BROMIDE 40 MG: 10 INJECTION, SOLUTION INTRAVENOUS at 07:30:00

## 2022-04-14 RX ADMIN — KETAMINE HYDROCHLORIDE 20 MG: 50 INJECTION, SOLUTION, CONCENTRATE INTRAMUSCULAR; INTRAVENOUS at 07:30:00

## 2022-04-14 RX ADMIN — SODIUM CHLORIDE, SODIUM LACTATE, POTASSIUM CHLORIDE, CALCIUM CHLORIDE: 600; 310; 30; 20 INJECTION, SOLUTION INTRAVENOUS at 10:49:00

## 2022-04-14 RX ADMIN — CEFAZOLIN SODIUM/WATER 2 G: 2 G/20 ML SYRINGE (ML) INTRAVENOUS at 07:35:00

## 2022-04-14 RX ADMIN — LIDOCAINE HYDROCHLORIDE 50 MG: 10 INJECTION, SOLUTION EPIDURAL; INFILTRATION; INTRACAUDAL; PERINEURAL at 07:30:00

## 2022-04-14 NOTE — PROGRESS NOTES
Plan for bilateral NS-mastectomy by Dr. Darren Banks and immediate reconstruction with tissue expander and acellular dermal matrix reviewed with patient. The risks of surgery including but not limited to bleeding, infection, scarring, delayed wound healing, seroma, asymmetry, implant infection/extrusion requiring removal, expander deflation, injury to adjacent structures, capsular contracture, ALCL, and need for further surgery were reviewed. The expected post-operative course was discussed. Questions were answered to the patient's satisfaction. No guarantees as to outcome were offered. The patient expresses understanding and wishes to proceed.

## 2022-04-14 NOTE — OPERATIVE REPORT
PREOPERATIVE DIAGNOSIS: Strong family history of breast cancer with acquired absence of bilateral breasts. POSTOPERATIVE DIAGNOSIS: Strong family history of breast cancer with acquired absence of bilateral breasts. PROCEDURE PERFORMED: Immediate bilateral breast reconstruction with tissue expander and acellular dermal matrix. ASSISTANT: RM Blanco  ANESTHESIA: General with endotracheal intubation. ESTIMATED BLOOD LOSS: 10ml  DRAINS: Heddie Ouch x 4  SPECIMENS:  None  COMPLICATIONS: None. FINDINGS: Bilateral breasts reconstructed with [place] artoura plus, smooth tissue expander, 475 mL. On the right, reference SDC-130H,  serial [id number]. On the left, reference SDC-130H, serial [id number]. The tissue expanders were placed in the prepectoral position with anterior coverage of Alloderm and filled intraoperatively with 200 mL of air. INDICATIONS: The patient is a 67-year-old female with a strong family history of breast cancer. The patient was evaluated by Dr. Kathia Barbosa and elected to undergo bilateral nipple-sparing mastectomy. She was referred preoperatively to discuss reconstructive options and opted for immediate reconstruction with tissue expanders and acellular dermal matrix. PROCEDURE: Informed consent was obtained from the patient. The risks, benefits, and alternatives were reviewed with the patient preoperatively. She expressed understanding and wished to proceed. The patient was marked in the preoperative holding area in the upright position. The midline, inframammary fold, lateral fold of the breasts were marked. Inframammary fold incisions were marked in conjunction with Dr. Kathia Barbosa. The patient was then taken to the operating room by Dr. Susu Beltrán team where she underwent bilateral nipple-sparing mastectomy. Once the mastectomies were completed, I entered the room and the plastic surgery portion of the procedure began. The surgical site was re-draped sterilely.   The mastectomy skin flaps were examined and appeared of suitable thickness of viability to facilitate immediate reconstruction. The pockets were irrigated with warm saline irrigation until all particulate fat was evacuated. Hemostasis was then secured with electrocautery. Next, the pockets were irrigated with Betadine irrigation and then with antibiotic irrigation until clear. Next gloves were changed and 4 sheets of Large contour perforated AlloDerm were brought on the field and prepared in saline. Two sheets of AlloDerm were secured along the long axis the running 2-0 PDS suture. The tissue expanders were then brought on the field and immediately bathed in  antibiotic irrigation. They were checked for integrity and noted to be intact. The AlloDerm was draped over the anterior surface of the tissue expander. Fenestrations were then created to allow passage of the suture tabs which were secured to the  AlloDerm with interrupted 2-0 PDS sutures. A posterior pursestring suture of 2-0 PDS was then placed. An identical construct was created for both sides. The expanders were then accessed and all air was evacuated. Gloves were then changed and the surgical sites were isolated with Marguerite Mercado. The tissue expander/AlloDerm construct were then delivered via the incisions and sutured to the chest wall with interrupted 2-0 PDS sutures. The AlloDerm along the inferior gutter was secured along the inframammary fold with interrupted 2-0 Vicryl sutures. Superiorly the AlloDerm was secured to the pectoralis muscle with interrupted 2-0 Vicryl sutures. Laterally, the AlloDerm was secured to the serratus fascia with interrupted 2-0 Vicryl sutures. The mastectomy margins were inspected and appeared well-perfused. Two 15-Turkish Eliezer drains on each side were exited through a lateral stab incision and sutured to the skin with 3-0 nylon suture. The expander was then accessed with a butterfly needle and filled with 200mL of air. This permitted tension-free closure of the skin edges. The skin was then closed with interrupted 3-0 Vicryl deep dermal suture and running 4-0 Monocryl subcuticular suture. The drain sites were dressed with BioPatch and Tegaderm. The incisions dressed with Exofin, steristrips, Fluff gauze, and a surgical bra. The patient was awakened, extubated, and taken to the recovery area in stable condition. There were no operative complications. All needle, sponge, and instrument counts were correct at the end of the procedure.

## 2022-04-14 NOTE — OPERATIVE REPORT
[facility]  Op Note    Joss Martinez Location: OR   [facility] [id number] MRN [id number]   Admission Date 4/14/2022 Operation Date 4/14/2022   Attending Physician Laila Clemente MD Operating Physician Vahe Lopez MD     DATE OF OPERATION:  4/14/2022  PREOPERATIVE DIAGNOSIS: Bilateral breast intraductal papilloma, family history of breast and ovarian cancer  POSTOPERATIVE DIAGNOSIS: Bilateral breast intraductal papilloma, family history of breast and ovarian cancer  PROCEDURE PERFORMED: Bilateral nipple sparing mastectomy    ANESTHESIA: Gen    SURGEON:  Lynda Velazquez M.D.  ASSISTANT:  August Cottrell PA-C    SPECIMEN:  1. Left breast mastectomy  2. Right breast mastectomy    ESTIMATED BLOOD LOSS: 30 mL. COMPLICATIONS: None. INDICATIONS: The patient is a 71-year-old female who has a history of bilateral intraductal papilloma. The patient also has a strong family history of breast and ovarian cancer. When discussing excision of her newly diagnosed left breast papilloma, she decided to proceed with a bilateral nipple sparing mastectomy. The risks, benefits, alternatives were discussed in detail with the patient. Risks included but not limited to, seroma development, infection and bleeding. We also discussed possible skin and nipple necrosis. The patient is agreeable to proceed with the operation. PROCEDURE: After informed consent was obtained, the patient was taken to the operating room where general anesthesia was induced. The patient's breasts and axilla were prepped and draped in the usual sterile fashion. Attention was then turned to the left breast.  An incision was made along the inframammary crease. Cautery was used to obtain hemostasis. Careful dissection was then used to dissect tissue flaps going superiorly, medially, and laterally. This dissection was quite meticulous, requiring assistance at retraction to preserve the skin, nipple and adequate tissue flaps.   The breast tissue was then grasped and excised off the underlying pectoralis muscle using cautery. The specimen was marked with a stitch in the axillary tail. Cautery was used to obtain hemostasis. The wound was irrigated with normal saline and then packed with a saline-moistened lap sponge. Attention was turned to the right breast.  In the same fashion, an incision was along the inframammary crease. Careful dissection was undertaken to raise tissue flaps going superiorly, medially, and laterally. Again, the dissection was quite meticulous, requiring careful retraction to preserve the skin, nipple and tissue flaps. Once the tissue flaps were raised, the breast tissue was grasped along its medial aspect and raised off the underlying fascia using cautery. Cautery was then used to obtain hemostasis. The wound was irrigated with normal saline and packed with a saline moistened lap sponge. A stitch was placed in the axillary tail of the breast, and the tissue was sent off the field. The patient remained in intubated, stable condition. Dr. Sherly Salazar then entered the case in order to complete the reconstructive process.      Kayce Reyes MD

## 2022-04-14 NOTE — BRIEF OP NOTE
Pre-Operative Diagnosis: Intraductal papilloma of right breast [D24.1]  Intraductal papilloma of left breast [D24.2]     Post-Operative Diagnosis: Intraductal papilloma of right breast [D24. 1]Intraductal papilloma of left breast [D24.2]      Procedure Performed:   BILATERAL NIPPLE SPARING MASTECTOMY Guinevere Back)  Immediate bilateral breast reconstruction with tissue expanders, acellular dermal matrix, (Valeria)  Pre-pec, alloderm ADM, 200cc intra-op air fill    Surgeon(s) and Role:  Panel 1:     * Marina Chappell MD - Primary  Panel 2:     * Leonie Paige MD - Primary    Assistant(s):  PA: RM Mace; Stevan Thorne [place]     Surgical Findings: as dictated     Specimen: per Dr. Benji Valenzuela     Estimated Blood Loss: Blood Output: 30 mL (4/14/2022 10:15 AM)    Kay Arnold PA-C  4/14/2022  10:41 AM

## 2022-04-14 NOTE — ANESTHESIA PROCEDURE NOTES
Regional Block  Performed by: Cassie Menezes MD  Authorized by: Cassie Menezes MD       General Information and Staff    Start Time:  4/14/2022 7:34 AM  End Time:  4/14/2022 7:39 AM  Anesthesiologist:  Cassie Menezes MD  Performed by: Anesthesiologist  Patient Location:  OR      Site Identification: real time ultrasound guided and image stored and retrievable    Block site/laterality marked before start: site marked  Reason for Block: at surgeon's request and post-op pain management    Preanesthetic Checklist: 2 patient identifers, IV checked, risks and benefits discussed, monitors and equipment checked, pre-op evaluation, timeout performed, anesthesia consent, sterile technique used, no prohibitive neurological deficits and no local skin infection at insertion site      Procedure Details    Patient Position:  Supine  Prep: ChloraPrep    Monitoring:  Cardiac monitor, continuous pulse ox and blood pressure cuff  Anesthesia block type: Serratus. Laterality:  Bilateral  Injection Technique:  Single-shot    Needle    Needle Type:  Short-bevel and echogenic  Needle Gauge:  21 G  Needle Length:  110 mm  Needle Localization:  Ultrasound guidance  Reason for Ultrasound Use: appropriate spread of the medication was noted in real time and no ultrasound evidence of intravascular and/or intraneural injection            Assessment    Injection Assessment:  Good spread noted, negative resistance, negative aspiration for heme, incremental injection and low pressure  Heart Rate Change: No    - Patient tolerated block procedure well without evidence of immediate block related complications.      Medications      Additional Comments    Medication:  Bupivacaine 0.25% 30mL  with 1:200,000 epi with 2mg decadron x 2

## 2022-04-14 NOTE — ANESTHESIA PROCEDURE NOTES
Airway  Date/Time: 4/14/2022 7:32 AM  Urgency: elective    Airway not difficult    General Information and Staff    Patient location during procedure: OR  Anesthesiologist: Klaus Nation MD  Performed: anesthesiologist     Indications and Patient Condition  Indications for airway management: anesthesia  Sedation level: deep  Preoxygenated: yes  Patient position: sniffing  Mask difficulty assessment: 1 - vent by mask    Final Airway Details  Final airway type: endotracheal airway      Successful airway: ETT  Cuffed: yes   Successful intubation technique: direct laryngoscopy  Endotracheal tube insertion site: oral  Blade: Nathan  ETT size (mm): 7.0    Cormack-Lehane Classification: grade I - full view of glottis  Placement verified by: chest auscultation and capnometry   Measured from: lips  ETT to lips (cm): 21  Number of attempts at approach: 1

## 2022-04-18 RX ORDER — HYDROCODONE BITARTRATE AND ACETAMINOPHEN 5; 325 MG/1; MG/1
1-2 TABLET ORAL EVERY 4 HOURS PRN
Qty: 40 TABLET | Refills: 0 | OUTPATIENT
Start: 2022-04-18

## 2022-04-19 RX ORDER — HYDROCODONE BITARTRATE AND ACETAMINOPHEN 5; 325 MG/1; MG/1
1-2 TABLET ORAL EVERY 4 HOURS PRN
Qty: 40 TABLET | Refills: 0 | Status: SHIPPED | OUTPATIENT
Start: 2022-04-19

## 2022-04-25 ENCOUNTER — OFFICE VISIT (OUTPATIENT)
Dept: SURGERY | Facility: CLINIC | Age: 37
End: 2022-04-25
Payer: COMMERCIAL

## 2022-04-25 DIAGNOSIS — Z90.13 ABSENCE OF BREAST, BILATERAL: Primary | ICD-10-CM

## 2022-04-25 PROCEDURE — 99024 POSTOP FOLLOW-UP VISIT: CPT | Performed by: PHYSICIAN ASSISTANT

## 2022-04-25 NOTE — PROGRESS NOTES
This is a 60-year-old female that is 11 days status post her bilateral nipple sparing mastectomies by Dr. Albin Vaz with immediate reconstruction with prepectoral placement of tissue expanders, AlloDerm ADM, 200 cc Intra-Op air fill by Dr. Jessy Asif. She is taking Norco sparingly for pain. Denies fevers or signs of infection. Denies chest pain, calf pain, shortness of breath. Denies nausea or vomiting. She has been compliant with compression, activity restrictions, drain recording, antibiotics. Exam:  Bilateral breast incisions healing well, left side with blistering along the incision line, steri-strips removed and neosporin dressing placed instead  No evidence of hematoma or seroma bilaterally  Bilateral mastectomy skin without erythema ecchymosis hyperemia skin breakdown or necrosis  Drain sites clean dry and intact drainage serosanguineous  Nipples viable bilaterally    Impression:  11 days status post her bilateral nipple sparing mastectomies by Dr. Albin Vaz with immediate reconstruction with prepectoral placement of tissue expanders, AlloDerm ADM, 200 cc Intra-Op air fill by Dr. Jose C Paz:  All continued wound care, signs of infection, reasons to contact our office reviewed in detail. 1 drain on each side was removed today due to low output. She tolerated this well. New drain site dressings were placed. Steri-Strips were removed from the left inframammary incision due to the blistering. Neosporin dressing was placed and she will continue Neosporin dressings daily. She will continue compression and activity restrictions. Antibiotics will continue until remaining drains have been removed. She will follow-up when her drain output meets parameters of 30 cc or less for 2 days in a row. Reviewed all continued activity restrictions and encouraged the use of compression. I did review the air to saline exchange process and the expansion process.   She will not require any further oncologic management as her pathology came back as papillomas.

## 2022-04-26 ENCOUNTER — OFFICE VISIT (OUTPATIENT)
Dept: SURGERY | Facility: CLINIC | Age: 37
End: 2022-04-26

## 2022-04-26 ENCOUNTER — OFFICE VISIT (OUTPATIENT)
Dept: HEMATOLOGY/ONCOLOGY | Facility: HOSPITAL | Age: 37
End: 2022-04-26
Attending: SURGERY
Payer: COMMERCIAL

## 2022-04-26 VITALS
WEIGHT: 156 LBS | OXYGEN SATURATION: 100 % | RESPIRATION RATE: 18 BRPM | BODY MASS INDEX: 27 KG/M2 | HEART RATE: 78 BPM | SYSTOLIC BLOOD PRESSURE: 125 MMHG | DIASTOLIC BLOOD PRESSURE: 81 MMHG | TEMPERATURE: 98 F

## 2022-04-26 DIAGNOSIS — Z90.13 ABSENCE OF BREAST, BILATERAL: ICD-10-CM

## 2022-04-26 DIAGNOSIS — D24.2 INTRADUCTAL PAPILLOMA OF LEFT BREAST: Primary | ICD-10-CM

## 2022-04-26 DIAGNOSIS — D24.1 INTRADUCTAL PAPILLOMA OF RIGHT BREAST: ICD-10-CM

## 2022-04-26 DIAGNOSIS — Z80.41 FAMILY HISTORY OF OVARIAN CANCER: ICD-10-CM

## 2022-04-26 DIAGNOSIS — Z80.3 FAMILY HISTORY OF BREAST CANCER IN FEMALE: ICD-10-CM

## 2022-04-26 PROCEDURE — 99024 POSTOP FOLLOW-UP VISIT: CPT | Performed by: SURGERY

## 2022-04-26 NOTE — PROGRESS NOTES
Preoperative Diagnosis: Bilateral breast intraductal papilloma, family history of breast and ovarian cancer. Patient presents for breast follow up. Bilateral nipple sparing mastectomy performed on 4/14/22. Here to discuss pathology results and plan of care. Patient reports minimal discomfort and states she is healing well. When seeing plastics doctor saw blistering from steri-sips but patient states they are not bothersome. Still on Cephalexin 500 MG tablet. Drains still in place. Patient reports she has appointment with neurologist next month to discuss medications. Medication and allergies reviewed and updated.

## 2022-04-28 ENCOUNTER — NURSE ONLY (OUTPATIENT)
Dept: SURGERY | Facility: CLINIC | Age: 37
End: 2022-04-28
Payer: COMMERCIAL

## 2022-04-28 NOTE — PROGRESS NOTES
Patient is status post her bilateral nipple sparing mastectomies by Dr. Noah Faulkner with immediate reconstruction with prepectoral placement of tissue expanders, AlloDerm ADM, 200 cc Intra-Op air fill by Dr. Jeronimo Golden on 4/14/2022    Patient denies any pain, nausea or shortness of breath. Patient state she in no longer taking narcotics. Patient is taking Advil in the evening before bed. Patient here for drain removal. Bilateral breast drains are less than 30cc for two consecutive days. Left breast skin breakdown IMF with possible mastectomy flap necrosis. Bilateral breast nipples are viable and well perfused. Left breast drain remain in place per Dr. Jeronimo Golden. Per Dr. Jeronimo Golden neosporin and gauze dressing over the wound and follow up next week for possible debridement. Right breast drain was removed due to low output. Neosporin, gauze and Tegaderm placed to drain site. Right breast steri strips removed. Per patient, has adhesive irritation no steri strips were replaced. Neosporin and gauzed was place to the right breast incision line. Patient tolerated this well. Patient has follow up appointment 5/3/2022 with Dr. Jeronimo Golden. Lennox Hernandez RN was present during the visit.

## 2022-05-02 ENCOUNTER — PATIENT OUTREACH (OUTPATIENT)
Dept: SURGERY | Facility: CLINIC | Age: 37
End: 2022-05-02

## 2022-05-03 ENCOUNTER — OFFICE VISIT (OUTPATIENT)
Dept: SURGERY | Facility: CLINIC | Age: 37
End: 2022-05-03
Payer: COMMERCIAL

## 2022-05-03 DIAGNOSIS — D24.2 INTRADUCTAL PAPILLOMA OF LEFT BREAST: ICD-10-CM

## 2022-05-03 DIAGNOSIS — Z90.13 ABSENCE OF BREAST, BILATERAL: Primary | ICD-10-CM

## 2022-05-03 PROCEDURE — 88305 TISSUE EXAM BY PATHOLOGIST: CPT | Performed by: SURGERY

## 2022-05-03 PROCEDURE — 99024 POSTOP FOLLOW-UP VISIT: CPT | Performed by: SURGERY

## 2022-05-03 NOTE — PROCEDURES
Debridement of left mastectomy skin flap necrosis. The areas of necrosis were encompassed in a horizontal ellipse. The area was infiltrated with approximately 6 cc of 1% lidocaine without epinephrine. The area was prepped and draped sterilely. The ellipse was excised and sent for permanent pathologic analysis. The wound was repaired in layered fashion with interrupted 3-0 Vicryl deep sutures and running 5-0 Prolene simple suture. Bacitracin, Xeroform, and gauze were applied. The patient tolerated the procedure well. There are no complications.

## 2022-05-03 NOTE — PROGRESS NOTES
Sebastian Santoyo is a 40year old female who presents today for a follow-up. Physical Examination:  Breasts: Right breast incisions are clean dry and intact. The left breast is noted to have marginal necrosis at the margins of the mastectomy skin flap. The areas spanned the length of the incision measuring approximately 1 cm in greatest diameter. There is no purulence, malodor, or surrounding erythema noted. Assessment and Plan:  Left breast mastectomy skin flap necrosis. We discussed debridement under local anesthesia. The risks, benefits, and alternatives were reviewed with the patient. She expresses understanding and wishes to proceed.

## 2022-05-12 ENCOUNTER — OFFICE VISIT (OUTPATIENT)
Dept: SURGERY | Facility: CLINIC | Age: 37
End: 2022-05-12
Payer: COMMERCIAL

## 2022-05-12 DIAGNOSIS — Z90.13 ABSENCE OF BREAST, BILATERAL: Primary | ICD-10-CM

## 2022-05-12 PROCEDURE — 99024 POSTOP FOLLOW-UP VISIT: CPT | Performed by: PHYSICIAN ASSISTANT

## 2022-05-12 NOTE — PROGRESS NOTES
This is a 60-year-old female that is 4 weeks status post her bilateral nipple sparing mastectomies by Dr. Marcha Mortimer with immediate reconstruction with prepectoral placement of tissue expanders, AlloDerm ADM, 200 cc Intra-Op air fill by Dr. Lou Glasgow. She is also 9 days status post an in office debridement and reclosure of the left mastectomy skin flap necrosis. She is here today for wound check. Denies fevers or signs of infection. Denies chest pain, calf pain, shortness of breath. Denies nausea or vomiting. She has been compliant with compression, activity restrictions, drain recording, antibiotics. Exam:  Bilateral nipples remain viable  Left mastectomy skin debridement and reclosure site with intact Prolene sutures, no evidence of wound separation  Right breast incision remains well-healed  Right mastectomy skin is without erythema ecchymosis hyperemia skin breakdown or necrosis  No evidence of hematoma or seroma bilaterally    Impression:  4 weeks status post her bilateral nipple sparing mastectomies by Dr. Marcha Mortimer with immediate reconstruction with prepectoral placement of tissue expanders, AlloDerm ADM, 200 cc Intra-Op air fill by Dr. Lou Glasgow. 9 days status post an in office debridement and reclosure of the left mastectomy skin flap necrosis    Plan:  No further oncologic management is necessary. Prolene sutures were left in place today as it has only been 9 days since her re-closure of the mastectomy skin. She is healing well without any evidence of new wound separation or reason for further management at this time. She will follow up in 1 week for suture removal and likely air to saline exchange at the same time barring any further setbacks.

## 2022-05-24 ENCOUNTER — OFFICE VISIT (OUTPATIENT)
Dept: SURGERY | Facility: CLINIC | Age: 37
End: 2022-05-24
Payer: COMMERCIAL

## 2022-05-24 DIAGNOSIS — Z90.13 ABSENCE OF BREAST, BILATERAL: Primary | ICD-10-CM

## 2022-05-24 PROCEDURE — 99024 POSTOP FOLLOW-UP VISIT: CPT | Performed by: SURGERY

## 2022-06-06 ENCOUNTER — OFFICE VISIT (OUTPATIENT)
Dept: SURGERY | Facility: CLINIC | Age: 37
End: 2022-06-06
Payer: COMMERCIAL

## 2022-06-06 DIAGNOSIS — Z90.13 ABSENCE OF BREAST, BILATERAL: Primary | ICD-10-CM

## 2022-06-13 ENCOUNTER — OFFICE VISIT (OUTPATIENT)
Dept: SURGERY | Facility: CLINIC | Age: 37
End: 2022-06-13
Payer: COMMERCIAL

## 2022-06-13 DIAGNOSIS — Z90.13 ABSENCE OF BREAST, BILATERAL: Primary | ICD-10-CM

## 2022-06-20 ENCOUNTER — OFFICE VISIT (OUTPATIENT)
Dept: SURGERY | Facility: CLINIC | Age: 37
End: 2022-06-20
Payer: COMMERCIAL

## 2022-06-20 DIAGNOSIS — Z90.13 ABSENCE OF BREAST, BILATERAL: Primary | ICD-10-CM

## 2022-06-30 ENCOUNTER — NURSE ONLY (OUTPATIENT)
Dept: SURGERY | Facility: CLINIC | Age: 37
End: 2022-06-30
Payer: COMMERCIAL

## 2022-06-30 PROCEDURE — 99024 POSTOP FOLLOW-UP VISIT: CPT | Performed by: SURGERY

## 2022-07-12 ENCOUNTER — OFFICE VISIT (OUTPATIENT)
Dept: SURGERY | Facility: CLINIC | Age: 37
End: 2022-07-12
Payer: COMMERCIAL

## 2022-07-12 DIAGNOSIS — Z90.13 ABSENCE OF BREAST, BILATERAL: Primary | ICD-10-CM

## 2022-07-12 PROCEDURE — 99024 POSTOP FOLLOW-UP VISIT: CPT | Performed by: SURGERY

## 2022-07-12 NOTE — PROGRESS NOTES
Shayy Crouch is a 40year old female who presents today for a follow-up. She is without new complaints and would like to proceed with the second stage of her reconstruction. She currently has a 13.5 cm base diameter tissue expander filled to 490 cc. Physical Examination:  Breasts: Bilateral breast incisions are clean dry and intact. Good shape and symmetry is appreciated. Abdomen: Well-healed laparoscopic port sites are noted. There are no palpable hernias noted. Procedure: Bilateral breast sterilely expanded with 60 cc of saline to a total of 550 cc. Assessment and Plan:  Patient is doing well. She currently has a 13.5 cm base diameter tissue expander filled to 550 cc. We discussed the plan for the second stage which will include removal of bilateral breast tissue expanders, placement of smooth, round, silicone implants, and fat grafting to bilateral reconstructed breasts. The nature of the procedure was reviewed with the patient. We discussed the risks of surgery including but not limited to bleeding, infection, scarring, delayed wound healing, asymmetry, implant infection or extrusion requiring removal, ALCL, capsular contracture, hypertrophic scarring or keloid, injury to intra-abdominal structures, contour abnormalities, cysts or calcifications requiring biopsy, and need for further surgery. We reviewed the expected postoperative course including possible need for drains, as well as need for activity limitation and compression. Multiple questions were answered the patient's satisfaction. No guarantees as to outcome were offered. The patient expresses understanding and wishes to proceed.

## 2022-07-19 ENCOUNTER — TELEPHONE (OUTPATIENT)
Dept: SURGERY | Facility: CLINIC | Age: 37
End: 2022-07-19

## 2022-07-25 ENCOUNTER — TELEPHONE (OUTPATIENT)
Dept: SURGERY | Facility: CLINIC | Age: 37
End: 2022-07-25

## 2022-07-25 DIAGNOSIS — Z90.13 ABSENCE OF BREAST, BILATERAL: Primary | ICD-10-CM

## 2022-07-25 NOTE — TELEPHONE ENCOUNTER
Calling pt in regards to scheduling surgery. Informed pt that I have 10/26/2022 available at [facility] with Dr. Ag Rivera. Pt verbalized understanding and in agreement with date and location. All questions answered. Encouraged pt to call or [facility] message office with any other questions or concerns.

## 2022-08-25 ENCOUNTER — LAB ENCOUNTER (OUTPATIENT)
Dept: LAB | Age: 37
End: 2022-08-25
Attending: FAMILY MEDICINE
Payer: COMMERCIAL

## 2022-08-25 ENCOUNTER — OFFICE VISIT (OUTPATIENT)
Dept: FAMILY MEDICINE CLINIC | Facility: CLINIC | Age: 37
End: 2022-08-25
Payer: COMMERCIAL

## 2022-08-25 VITALS
HEART RATE: 72 BPM | TEMPERATURE: 98 F | SYSTOLIC BLOOD PRESSURE: 112 MMHG | WEIGHT: 167 LBS | RESPIRATION RATE: 16 BRPM | DIASTOLIC BLOOD PRESSURE: 66 MMHG | BODY MASS INDEX: 28.51 KG/M2 | HEIGHT: 64 IN

## 2022-08-25 DIAGNOSIS — R63.5 WEIGHT GAIN: ICD-10-CM

## 2022-08-25 DIAGNOSIS — R63.5 WEIGHT GAIN: Primary | ICD-10-CM

## 2022-08-25 LAB
T4 FREE SERPL-MCNC: 0.9 NG/DL (ref 0.8–1.7)
TSI SER-ACNC: 1.84 MIU/ML (ref 0.36–3.74)

## 2022-08-25 PROCEDURE — 84439 ASSAY OF FREE THYROXINE: CPT | Performed by: FAMILY MEDICINE

## 2022-08-25 PROCEDURE — 3074F SYST BP LT 130 MM HG: CPT | Performed by: FAMILY MEDICINE

## 2022-08-25 PROCEDURE — 3008F BODY MASS INDEX DOCD: CPT | Performed by: FAMILY MEDICINE

## 2022-08-25 PROCEDURE — 3078F DIAST BP <80 MM HG: CPT | Performed by: FAMILY MEDICINE

## 2022-08-25 PROCEDURE — 84443 ASSAY THYROID STIM HORMONE: CPT | Performed by: FAMILY MEDICINE

## 2022-08-25 PROCEDURE — 99213 OFFICE O/P EST LOW 20 MIN: CPT | Performed by: FAMILY MEDICINE

## 2022-08-25 NOTE — PATIENT INSTRUCTIONS
Start topiramate 25 mg once daily for 1 week then increase to 50mg daily for 1 week then increase to 100mg for 1 week then up to 150mg.

## 2022-09-30 DIAGNOSIS — Z01.818 PREOP TESTING: Primary | ICD-10-CM

## 2022-10-18 ENCOUNTER — OFFICE VISIT (OUTPATIENT)
Dept: FAMILY MEDICINE CLINIC | Facility: CLINIC | Age: 37
End: 2022-10-18
Payer: COMMERCIAL

## 2022-10-18 ENCOUNTER — LAB ENCOUNTER (OUTPATIENT)
Dept: LAB | Age: 37
End: 2022-10-18
Attending: FAMILY MEDICINE
Payer: COMMERCIAL

## 2022-10-18 VITALS — HEIGHT: 64 IN | WEIGHT: 162 LBS | BODY MASS INDEX: 27.66 KG/M2

## 2022-10-18 DIAGNOSIS — Z90.13 ABSENCE OF BREAST, BILATERAL: ICD-10-CM

## 2022-10-18 DIAGNOSIS — Z90.13 H/O BILATERAL MASTECTOMY: ICD-10-CM

## 2022-10-18 DIAGNOSIS — Z01.818 PREOPERATIVE CLEARANCE: Primary | ICD-10-CM

## 2022-10-18 DIAGNOSIS — Z01.818 PREOP TESTING: ICD-10-CM

## 2022-10-18 PROBLEM — D24.2 INTRADUCTAL PAPILLOMA OF LEFT BREAST: Status: RESOLVED | Noted: 2022-01-11 | Resolved: 2022-10-18

## 2022-10-18 LAB
ALBUMIN SERPL-MCNC: 4.2 G/DL (ref 3.4–5)
ALBUMIN/GLOB SERPL: 1.8 {RATIO} (ref 1–2)
ALP LIVER SERPL-CCNC: 41 U/L
ALT SERPL-CCNC: 20 U/L
ANION GAP SERPL CALC-SCNC: 7 MMOL/L (ref 0–18)
AST SERPL-CCNC: 11 U/L (ref 15–37)
BASOPHILS # BLD AUTO: 0.02 X10(3) UL (ref 0–0.2)
BASOPHILS NFR BLD AUTO: 0.5 %
BILIRUB SERPL-MCNC: 0.5 MG/DL (ref 0.1–2)
BUN BLD-MCNC: 10 MG/DL (ref 7–18)
BUN/CREAT SERPL: 10.4 (ref 10–20)
CALCIUM BLD-MCNC: 8.8 MG/DL (ref 8.5–10.1)
CHLORIDE SERPL-SCNC: 115 MMOL/L (ref 98–112)
CO2 SERPL-SCNC: 21 MMOL/L (ref 21–32)
CREAT BLD-MCNC: 0.96 MG/DL
DEPRECATED RDW RBC AUTO: 43.8 FL (ref 35.1–46.3)
EOSINOPHIL # BLD AUTO: 0.08 X10(3) UL (ref 0–0.7)
EOSINOPHIL NFR BLD AUTO: 2 %
ERYTHROCYTE [DISTWIDTH] IN BLOOD BY AUTOMATED COUNT: 13.2 % (ref 11–15)
FASTING STATUS PATIENT QL REPORTED: YES
GFR SERPLBLD BASED ON 1.73 SQ M-ARVRAT: 79 ML/MIN/1.73M2 (ref 60–?)
GLOBULIN PLAS-MCNC: 2.4 G/DL (ref 2.8–4.4)
GLUCOSE BLD-MCNC: 97 MG/DL (ref 70–99)
HCT VFR BLD AUTO: 43 %
HGB BLD-MCNC: 14.8 G/DL
IMM GRANULOCYTES # BLD AUTO: 0.02 X10(3) UL (ref 0–1)
IMM GRANULOCYTES NFR BLD: 0.5 %
LYMPHOCYTES # BLD AUTO: 1.46 X10(3) UL (ref 1–4)
LYMPHOCYTES NFR BLD AUTO: 37.3 %
MCH RBC QN AUTO: 31.5 PG (ref 26–34)
MCHC RBC AUTO-ENTMCNC: 34.4 G/DL (ref 31–37)
MCV RBC AUTO: 91.5 FL
MONOCYTES # BLD AUTO: 0.36 X10(3) UL (ref 0.1–1)
MONOCYTES NFR BLD AUTO: 9.2 %
NEUTROPHILS # BLD AUTO: 1.97 X10 (3) UL (ref 1.5–7.7)
NEUTROPHILS # BLD AUTO: 1.97 X10(3) UL (ref 1.5–7.7)
NEUTROPHILS NFR BLD AUTO: 50.5 %
OSMOLALITY SERPL CALC.SUM OF ELEC: 295 MOSM/KG (ref 275–295)
PLATELET # BLD AUTO: 219 10(3)UL (ref 150–450)
POTASSIUM SERPL-SCNC: 4.3 MMOL/L (ref 3.5–5.1)
PROT SERPL-MCNC: 6.6 G/DL (ref 6.4–8.2)
RBC # BLD AUTO: 4.7 X10(6)UL
SODIUM SERPL-SCNC: 143 MMOL/L (ref 136–145)
WBC # BLD AUTO: 3.9 X10(3) UL (ref 4–11)

## 2022-10-18 PROCEDURE — 80053 COMPREHEN METABOLIC PANEL: CPT | Performed by: FAMILY MEDICINE

## 2022-10-18 PROCEDURE — 3008F BODY MASS INDEX DOCD: CPT | Performed by: FAMILY MEDICINE

## 2022-10-18 PROCEDURE — 99242 OFF/OP CONSLTJ NEW/EST SF 20: CPT | Performed by: FAMILY MEDICINE

## 2022-10-18 PROCEDURE — 85025 COMPLETE CBC W/AUTO DIFF WBC: CPT | Performed by: FAMILY MEDICINE

## 2022-10-18 RX ORDER — TOPIRAMATE 100 MG/1
150 TABLET, FILM COATED ORAL NIGHTLY
COMMUNITY
End: 2022-10-18

## 2022-10-21 RX ORDER — IBUPROFEN 200 MG
200 TABLET ORAL EVERY 6 HOURS PRN
COMMUNITY

## 2022-10-24 ENCOUNTER — LAB ENCOUNTER (OUTPATIENT)
Dept: LAB | Facility: HOSPITAL | Age: 37
End: 2022-10-24
Attending: SURGERY
Payer: COMMERCIAL

## 2022-10-24 DIAGNOSIS — Z01.812 ENCOUNTER FOR PREOPERATIVE SCREENING LABORATORY TESTING FOR COVID-19 VIRUS: ICD-10-CM

## 2022-10-24 DIAGNOSIS — Z20.822 ENCOUNTER FOR PREOPERATIVE SCREENING LABORATORY TESTING FOR COVID-19 VIRUS: ICD-10-CM

## 2022-10-25 ENCOUNTER — ANESTHESIA EVENT (OUTPATIENT)
Dept: SURGERY | Facility: HOSPITAL | Age: 37
End: 2022-10-25
Payer: COMMERCIAL

## 2022-10-25 LAB — SARS-COV-2 RNA RESP QL NAA+PROBE: NOT DETECTED

## 2022-10-26 ENCOUNTER — HOSPITAL ENCOUNTER (OUTPATIENT)
Facility: HOSPITAL | Age: 37
Setting detail: HOSPITAL OUTPATIENT SURGERY
Discharge: HOME | End: 2022-10-26
Attending: SURGERY | Admitting: SURGERY
Payer: COMMERCIAL

## 2022-10-26 ENCOUNTER — ANESTHESIA (OUTPATIENT)
Dept: SURGERY | Facility: HOSPITAL | Age: 37
End: 2022-10-26
Payer: COMMERCIAL

## 2022-10-26 VITALS
BODY MASS INDEX: 27.21 KG/M2 | OXYGEN SATURATION: 100 % | SYSTOLIC BLOOD PRESSURE: 112 MMHG | RESPIRATION RATE: 18 BRPM | HEART RATE: 68 BPM | HEIGHT: 64 IN | WEIGHT: 159.38 LBS | TEMPERATURE: 98 F | DIASTOLIC BLOOD PRESSURE: 60 MMHG

## 2022-10-26 DIAGNOSIS — Z01.812 ENCOUNTER FOR PREOPERATIVE SCREENING LABORATORY TESTING FOR COVID-19 VIRUS: Primary | ICD-10-CM

## 2022-10-26 DIAGNOSIS — Z20.822 ENCOUNTER FOR PREOPERATIVE SCREENING LABORATORY TESTING FOR COVID-19 VIRUS: Primary | ICD-10-CM

## 2022-10-26 DIAGNOSIS — Z90.13 ABSENCE OF BREAST, BILATERAL: ICD-10-CM

## 2022-10-26 LAB — B-HCG UR QL: NEGATIVE

## 2022-10-26 PROCEDURE — 0HPT0NZ REMOVAL OF TISSUE EXPANDER FROM RIGHT BREAST, OPEN APPROACH: ICD-10-PCS | Performed by: SURGERY

## 2022-10-26 PROCEDURE — 0JD83ZZ EXTRACTION OF ABDOMEN SUBCUTANEOUS TISSUE AND FASCIA, PERCUTANEOUS APPROACH: ICD-10-PCS | Performed by: SURGERY

## 2022-10-26 PROCEDURE — 81025 URINE PREGNANCY TEST: CPT

## 2022-10-26 PROCEDURE — 0HRV0JZ REPLACEMENT OF BILATERAL BREAST WITH SYNTHETIC SUBSTITUTE, OPEN APPROACH: ICD-10-PCS | Performed by: SURGERY

## 2022-10-26 PROCEDURE — 88305 TISSUE EXAM BY PATHOLOGIST: CPT | Performed by: SURGERY

## 2022-10-26 PROCEDURE — 0HPU0NZ REMOVAL OF TISSUE EXPANDER FROM LEFT BREAST, OPEN APPROACH: ICD-10-PCS | Performed by: SURGERY

## 2022-10-26 DEVICE — NATRELLE INSPIRA SSF 605CC FULL PROFILE  SMOOTH ROUND SILICONE
Type: IMPLANTABLE DEVICE | Site: BREAST | Status: FUNCTIONAL
Brand: NATRELLE INSPIRA SOFTTOUCH BREAST IMPLANTS

## 2022-10-26 RX ORDER — SODIUM CHLORIDE, SODIUM LACTATE, POTASSIUM CHLORIDE, CALCIUM CHLORIDE 600; 310; 30; 20 MG/100ML; MG/100ML; MG/100ML; MG/100ML
INJECTION, SOLUTION INTRAVENOUS CONTINUOUS
Status: DISCONTINUED | OUTPATIENT
Start: 2022-10-26 | End: 2022-10-26

## 2022-10-26 RX ORDER — HYDROMORPHONE HYDROCHLORIDE 1 MG/ML
0.2 INJECTION, SOLUTION INTRAMUSCULAR; INTRAVENOUS; SUBCUTANEOUS EVERY 5 MIN PRN
Status: DISCONTINUED | OUTPATIENT
Start: 2022-10-26 | End: 2022-10-26

## 2022-10-26 RX ORDER — PROCHLORPERAZINE EDISYLATE 5 MG/ML
5 INJECTION INTRAMUSCULAR; INTRAVENOUS EVERY 8 HOURS PRN
Status: DISCONTINUED | OUTPATIENT
Start: 2022-10-26 | End: 2022-10-26

## 2022-10-26 RX ORDER — ONDANSETRON 2 MG/ML
4 INJECTION INTRAMUSCULAR; INTRAVENOUS EVERY 6 HOURS PRN
Status: DISCONTINUED | OUTPATIENT
Start: 2022-10-26 | End: 2022-10-26

## 2022-10-26 RX ORDER — MIDAZOLAM HYDROCHLORIDE 1 MG/ML
INJECTION INTRAMUSCULAR; INTRAVENOUS AS NEEDED
Status: DISCONTINUED | OUTPATIENT
Start: 2022-10-26 | End: 2022-10-26 | Stop reason: SURG

## 2022-10-26 RX ORDER — HYDROMORPHONE HYDROCHLORIDE 1 MG/ML
0.6 INJECTION, SOLUTION INTRAMUSCULAR; INTRAVENOUS; SUBCUTANEOUS EVERY 5 MIN PRN
Status: DISCONTINUED | OUTPATIENT
Start: 2022-10-26 | End: 2022-10-26

## 2022-10-26 RX ORDER — ACETAMINOPHEN 500 MG
1000 TABLET ORAL ONCE
Status: DISCONTINUED | OUTPATIENT
Start: 2022-10-26 | End: 2022-10-26 | Stop reason: HOSPADM

## 2022-10-26 RX ORDER — ONDANSETRON 2 MG/ML
INJECTION INTRAMUSCULAR; INTRAVENOUS AS NEEDED
Status: DISCONTINUED | OUTPATIENT
Start: 2022-10-26 | End: 2022-10-26 | Stop reason: SURG

## 2022-10-26 RX ORDER — HYDROMORPHONE HYDROCHLORIDE 1 MG/ML
0.4 INJECTION, SOLUTION INTRAMUSCULAR; INTRAVENOUS; SUBCUTANEOUS EVERY 5 MIN PRN
Status: DISCONTINUED | OUTPATIENT
Start: 2022-10-26 | End: 2022-10-26

## 2022-10-26 RX ORDER — DEXAMETHASONE SODIUM PHOSPHATE 4 MG/ML
VIAL (ML) INJECTION AS NEEDED
Status: DISCONTINUED | OUTPATIENT
Start: 2022-10-26 | End: 2022-10-26 | Stop reason: SURG

## 2022-10-26 RX ORDER — HYDROCODONE BITARTRATE AND ACETAMINOPHEN 5; 325 MG/1; MG/1
2 TABLET ORAL ONCE AS NEEDED
Status: COMPLETED | OUTPATIENT
Start: 2022-10-26 | End: 2022-10-26

## 2022-10-26 RX ORDER — MEPERIDINE HYDROCHLORIDE 25 MG/ML
INJECTION INTRAMUSCULAR; INTRAVENOUS; SUBCUTANEOUS
Status: COMPLETED
Start: 2022-10-26 | End: 2022-10-26

## 2022-10-26 RX ORDER — HYDROCODONE BITARTRATE AND ACETAMINOPHEN 5; 325 MG/1; MG/1
1 TABLET ORAL ONCE AS NEEDED
Status: COMPLETED | OUTPATIENT
Start: 2022-10-26 | End: 2022-10-26

## 2022-10-26 RX ORDER — CEPHALEXIN 500 MG/1
500 CAPSULE ORAL 4 TIMES DAILY
Qty: 20 CAPSULE | Refills: 0 | Status: SHIPPED | OUTPATIENT
Start: 2022-10-26

## 2022-10-26 RX ORDER — HYDROCODONE BITARTRATE AND ACETAMINOPHEN 5; 325 MG/1; MG/1
1-2 TABLET ORAL EVERY 4 HOURS PRN
Qty: 20 TABLET | Refills: 0 | Status: SHIPPED | OUTPATIENT
Start: 2022-10-26

## 2022-10-26 RX ORDER — ROCURONIUM BROMIDE 10 MG/ML
INJECTION, SOLUTION INTRAVENOUS AS NEEDED
Status: DISCONTINUED | OUTPATIENT
Start: 2022-10-26 | End: 2022-10-26 | Stop reason: SURG

## 2022-10-26 RX ORDER — MIDAZOLAM HYDROCHLORIDE 1 MG/ML
1 INJECTION INTRAMUSCULAR; INTRAVENOUS EVERY 5 MIN PRN
Status: DISCONTINUED | OUTPATIENT
Start: 2022-10-26 | End: 2022-10-26

## 2022-10-26 RX ORDER — NEOSTIGMINE METHYLSULFATE 1 MG/ML
INJECTION, SOLUTION INTRAVENOUS AS NEEDED
Status: DISCONTINUED | OUTPATIENT
Start: 2022-10-26 | End: 2022-10-26 | Stop reason: SURG

## 2022-10-26 RX ORDER — NALOXONE HYDROCHLORIDE 0.4 MG/ML
80 INJECTION, SOLUTION INTRAMUSCULAR; INTRAVENOUS; SUBCUTANEOUS AS NEEDED
Status: DISCONTINUED | OUTPATIENT
Start: 2022-10-26 | End: 2022-10-26

## 2022-10-26 RX ORDER — DOCUSATE SODIUM 100 MG/1
100 CAPSULE, LIQUID FILLED ORAL 2 TIMES DAILY
Qty: 30 CAPSULE | Refills: 1 | Status: SHIPPED | OUTPATIENT
Start: 2022-10-26

## 2022-10-26 RX ORDER — LIDOCAINE HYDROCHLORIDE 10 MG/ML
INJECTION, SOLUTION EPIDURAL; INFILTRATION; INTRACAUDAL; PERINEURAL AS NEEDED
Status: DISCONTINUED | OUTPATIENT
Start: 2022-10-26 | End: 2022-10-26 | Stop reason: SURG

## 2022-10-26 RX ORDER — ACETAMINOPHEN 500 MG
1000 TABLET ORAL ONCE AS NEEDED
Status: COMPLETED | OUTPATIENT
Start: 2022-10-26 | End: 2022-10-26

## 2022-10-26 RX ORDER — SCOLOPAMINE TRANSDERMAL SYSTEM 1 MG/1
1 PATCH, EXTENDED RELEASE TRANSDERMAL ONCE
Status: DISCONTINUED | OUTPATIENT
Start: 2022-10-26 | End: 2022-10-26 | Stop reason: HOSPADM

## 2022-10-26 RX ORDER — LIDOCAINE HYDROCHLORIDE AND EPINEPHRINE 10; 10 MG/ML; UG/ML
INJECTION, SOLUTION INFILTRATION; PERINEURAL AS NEEDED
Status: DISCONTINUED | OUTPATIENT
Start: 2022-10-26 | End: 2022-10-26

## 2022-10-26 RX ORDER — ONDANSETRON 4 MG/1
4 TABLET, FILM COATED ORAL EVERY 8 HOURS PRN
Qty: 12 TABLET | Refills: 0 | Status: SHIPPED | OUTPATIENT
Start: 2022-10-26

## 2022-10-26 RX ORDER — CEFAZOLIN SODIUM/WATER 2 G/20 ML
2 SYRINGE (ML) INTRAVENOUS ONCE
Status: COMPLETED | OUTPATIENT
Start: 2022-10-26 | End: 2022-10-26

## 2022-10-26 RX ORDER — GLYCOPYRROLATE 0.2 MG/ML
INJECTION, SOLUTION INTRAMUSCULAR; INTRAVENOUS AS NEEDED
Status: DISCONTINUED | OUTPATIENT
Start: 2022-10-26 | End: 2022-10-26 | Stop reason: SURG

## 2022-10-26 RX ORDER — MEPERIDINE HYDROCHLORIDE 25 MG/ML
12.5 INJECTION INTRAMUSCULAR; INTRAVENOUS; SUBCUTANEOUS AS NEEDED
Status: DISCONTINUED | OUTPATIENT
Start: 2022-10-26 | End: 2022-10-26

## 2022-10-26 RX ORDER — KETAMINE HYDROCHLORIDE 50 MG/ML
INJECTION, SOLUTION, CONCENTRATE INTRAMUSCULAR; INTRAVENOUS AS NEEDED
Status: DISCONTINUED | OUTPATIENT
Start: 2022-10-26 | End: 2022-10-26 | Stop reason: SURG

## 2022-10-26 RX ADMIN — KETAMINE HYDROCHLORIDE 25 MG: 50 INJECTION, SOLUTION, CONCENTRATE INTRAMUSCULAR; INTRAVENOUS at 12:02:00

## 2022-10-26 RX ADMIN — ROCURONIUM BROMIDE 50 MG: 10 INJECTION, SOLUTION INTRAVENOUS at 12:02:00

## 2022-10-26 RX ADMIN — CEFAZOLIN SODIUM/WATER 2 G: 2 G/20 ML SYRINGE (ML) INTRAVENOUS at 12:06:00

## 2022-10-26 RX ADMIN — ONDANSETRON 4 MG: 2 INJECTION INTRAMUSCULAR; INTRAVENOUS at 13:59:00

## 2022-10-26 RX ADMIN — LIDOCAINE HYDROCHLORIDE 50 MG: 10 INJECTION, SOLUTION EPIDURAL; INFILTRATION; INTRACAUDAL; PERINEURAL at 12:02:00

## 2022-10-26 RX ADMIN — GLYCOPYRROLATE 0.6 MG: 0.2 INJECTION, SOLUTION INTRAMUSCULAR; INTRAVENOUS at 14:04:00

## 2022-10-26 RX ADMIN — GLYCOPYRROLATE 0.2 MG: 0.2 INJECTION, SOLUTION INTRAMUSCULAR; INTRAVENOUS at 12:02:00

## 2022-10-26 RX ADMIN — SODIUM CHLORIDE, SODIUM LACTATE, POTASSIUM CHLORIDE, CALCIUM CHLORIDE: 600; 310; 30; 20 INJECTION, SOLUTION INTRAVENOUS at 14:19:00

## 2022-10-26 RX ADMIN — SODIUM CHLORIDE, SODIUM LACTATE, POTASSIUM CHLORIDE, CALCIUM CHLORIDE: 600; 310; 30; 20 INJECTION, SOLUTION INTRAVENOUS at 13:22:00

## 2022-10-26 RX ADMIN — DEXAMETHASONE SODIUM PHOSPHATE 8 MG: 4 MG/ML VIAL (ML) INJECTION at 12:08:00

## 2022-10-26 RX ADMIN — MIDAZOLAM HYDROCHLORIDE 2 MG: 1 INJECTION INTRAMUSCULAR; INTRAVENOUS at 11:59:00

## 2022-10-26 RX ADMIN — SODIUM CHLORIDE, SODIUM LACTATE, POTASSIUM CHLORIDE, CALCIUM CHLORIDE: 600; 310; 30; 20 INJECTION, SOLUTION INTRAVENOUS at 11:59:00

## 2022-10-26 RX ADMIN — NEOSTIGMINE METHYLSULFATE 4 MG: 1 INJECTION, SOLUTION INTRAVENOUS at 14:04:00

## 2022-10-26 NOTE — ANESTHESIA POSTPROCEDURE EVALUATION
[address] Patient Status:  Hospital Outpatient Surgery   Age/Gender 40year old female MRN [id number]   [facility] SURGERY Attending Beatrice Mao MD   Hosp Day # 0 PCP Jon MD Marina       Anesthesia Post-op Note    Removal of bilateral breast tissue expanders, permanent implant placement, autologous fat grafting to bilateral reconstructed breasts    Procedure Summary     Date: 10/26/22 Room / Location: [facility] OR 03 / 1404 [facility] OR    Anesthesia Start: 1159 Anesthesia Stop: 1591    Procedure: Removal of bilateral breast tissue expanders, permanent implant placement, autologous fat grafting to bilateral reconstructed breasts (Bilateral: Breast) Diagnosis:       Absence of breast, bilateral      (Absence of breast, bilateral [Z90.13])    Surgeons: Beatrice Mao MD Anesthesiologist: Amanda Rodriguez MD    Anesthesia Type: general ASA Status: 2          Anesthesia Type: general    Vitals Value Taken Time   /85 10/26/22 1419   Temp 98.1 10/26/22 1419   Pulse 109 10/26/22 1419   Resp 18 10/26/22 1419   SpO2 98 10/26/22 1419       Patient Location: PACU    Anesthesia Type: general    Airway Patency: patent and extubated    Postop Pain Control: adequate    Mental Status: preanesthetic baseline    Nausea/Vomiting: none    Cardiopulmonary/Hydration status: stable euvolemic    Complications: no apparent anesthesia related complications    Postop vital signs: stable    Dental Exam: Unchanged from Preop    Patient to be discharged from PACU when criteria met.

## 2022-10-26 NOTE — ANESTHESIA PROCEDURE NOTES
Airway  Date/Time: 10/26/2022 12:04 PM  Urgency: elective      General Information and Staff    Patient location during procedure: OR  Anesthesiologist: Magaly Patel MD  Resident/CRNA: Kev Sarkar CRNA  Performed: CRNA     Indications and Patient Condition  Indications for airway management: anesthesia  Spontaneous Ventilation: absent  Sedation level: deep  Preoxygenated: yes  Patient position: sniffing  MILS not maintained throughout  Mask difficulty assessment: 1 - vent by mask    Final Airway Details  Final airway type: endotracheal airway      Successful airway: ETT  Cuffed: yes   Successful intubation technique: direct laryngoscopy  Facilitating devices/methods: intubating stylet  Endotracheal tube insertion site: oral  Blade: Nathan  Blade size: #3  ETT size (mm): 7.0    Cormack-Lehane Classification: grade I - full view of glottis  Placement verified by: chest auscultation and capnometry   Measured from: lips  ETT to lips (cm): 21  Number of attempts at approach: 1  Number of other approaches attempted: 0    Additional Comments  Dentition per pre op

## 2022-10-26 NOTE — DISCHARGE INSTRUCTIONS
Call for fevers, chills, erythema. May shower in 48 hours but no bathing. Leave steristrips in place. Wear abdominal binder and sports/surgical bra at all times. No heavy lifting or strenuous activity.   Normal diet      1 norco given at  3:30pm

## 2022-10-26 NOTE — BRIEF OP NOTE
Pre-Operative Diagnosis: Absence of breast, bilateral [Z90.13]     Post-Operative Diagnosis: Absence of breast, bilateral [Z90.13]      Procedure Performed:   Removal of bilateral breast tissue expanders, permanent implant placement, autologous fat grafting to bilateral reconstructed breasts    Surgeon(s) and Role:     Arina Simpson MD - Primary    Assistant(s):  Surgical Assistant.: Macie Canseco     Surgical Findings: Nl     Specimen: b/l breast scar     Estimated Blood Loss: 10ml    Jeison Claire MD  10/26/2022  2:22 PM

## 2022-10-27 NOTE — OPERATIVE REPORT
[facility]    PATIENT'S NAME: Jenelle NOONAN   ATTENDING PHYSICIAN: Cholo Dong M.D. OPERATING PHYSICIAN: Cholo Dong M.D. PATIENT ACCOUNT#:   [de-identified]    LOCATION:  [place] [address]  MEDICAL RECORD #:   [id number]       YOB: 1985  ADMISSION DATE:       10/26/2022      OPERATION DATE:  10/26/2022    OPERATIVE REPORT      PREOPERATIVE DIAGNOSIS:  History of bilateral mastectomy and tissue expander reconstruction. POSTOPERATIVE DIAGNOSIS:  History of bilateral mastectomy and tissue expander reconstruction. PROCEDURE:    1. Removal of bilateral breast tissue expanders. 2.   Bilateral breast capsulotomy. 3.   Placement of silicone gel implants bilateral breasts. 4.   Autologous fat grafting to bilateral reconstructed breasts. ASSISTANT:  BETTINA Covarrubias    ANESTHESIA:  General.    ESTIMATED BLOOD LOSS:  10 mL. COMPLICATIONS:  None     INDICATIONS:  Patient is a 29-year-old female who previously underwent bilateral nipple sparing mastectomy and prepectoral tissue expander reconstruction. She completed an uncomplicated expansion and now presents for exchange of permanent implants and fat grafting. FINDINGS:  Bilateral breasts reconstructed with Natrelle Inspira Soft Touch breast implant, 605 mL, reference SSS-605, on the right serial number [id number], on the left serial.  number [id number]. OPERATIVE TECHNIQUE:  Informed consent was obtained from the patient. The risks, benefits, and alternatives were reviewed with the patient preoperatively. She expressed understanding and wished to proceed. The patient was marked in the preoperative holding area in the upright position. The midline and inframammary folds were marked. The bilateral inframammary fold scars were encompassed in transverse ellipses. The areas of flank and upper abdominal lipodystrophy were marked for liposuction. Areas to be fat grafted were marked.   The patient was then taken the operating room, properly identified, placed in the supine position. Sequential compression devices were placed on bilateral lower extremities. Intravenous antibiotic prophylaxis was administered. The patient then underwent successful induction of general anesthesia and endotracheal intubation. The arms were placed abducted on foam padded armboards and loosely secured with Kerlix. The chest and abdomen were prepped and draped sterilely. The proposed liposuction port sites in the lateral abdomen were infiltrated with 1% lidocaine with epinephrine. Tumescent solution was infiltrated into the upper abdomen and flanks, staying remote from the patient's laparoscopic port scars. Attention was then turned to the breast.  Both breast scars were infiltrated with 1% lidocaine with epinephrine. The scars were excised bilaterally and sent for permanent pathologic analysis as right and left breast scars. The attention was turned to the right breast.  A superior subcutaneous flap was then elevated sharply with a scalpel. A transverse capsulotomy was then created and the tissue expander was encountered and found to be intact. It was punctured, aspirated, and removed. The pocket was inspected. Complete incorporation of the acellular dermal matrix was noted. There were no visible or palpable nodules noted. There was no periprosthetic fluid noted. Sizers were placed and attention was turned to the left breast.  In a similar fashion, a superior subcutaneous flap was elevated sharply with a scalpel. A transverse capsulotomy was created. The tissue expander was encountered and found to be intact. The pocket was inspected. There was no periprosthetic fluid noted. There were no visible or palpable nodules noted. Complete incorporation of the acellular dermal matrix was noted. Again sizers were placed and attention was turned to the abdomen.   Using a 5 mm Mercedes tip cannula, the process of liposuction of the upper abdomen and flanks was performed taking care to stay remote from the patient's previous scars. Approximately 200 mL of Lipo aspirate were collected using the bitmovin system. The fat was prepared in the usual fashion. The liposuction port sites were closed with interrupted 3-0 Vicryl deep dermal sutures. With the patient in the upright position and sizers in place, fat was grafted to the bilateral breast.  Then, 25 mL was grafted to the right breast, 75 mL was grafted to the left breast.  Next, the capsules were released along their superior 180 degrees with electrocautery to facilitate expansion of the pocket. Sizers were placed. The patient was placed in the upright position. Ultimately, the 605 mL style SSF implant gave the best size and shape in accordance with the patient's desires. Both pockets were rinsed with Betadine and then antibiotic irrigation until clear. Hemostasis was checked and noted to be adequate. The surgical sites were isolated with Ioban and gloves were changed. The implants were brought on the field and immediately bathed in antibiotic irrigation. They were checked for integrity and noted to be intact. The implants were placed in the prepectoral space taking care to maintain their proper orientation. The capsules were closed with running 2-0 Vicryl sutures bilaterally. The deep dermis was reapproximated with interrupted 3-0 Vicryl deep dermal suture and running 4-0 Monocryl subcuticular suture. Bacitracin, Xeroform, gauze, and a bra were placed on the breasts. Band-Aids, TopiFoam, and an abdominal binder were placed in the abdomen. The patient was awakened, extubated, taken to Recovery in stable condition. There were no operative complications. All needle, sponge, and instrument counts were correct at the end of the procedure. Dictated By Tim Starr M.D.  d: 10/26/2022 14:31:42  t: 10/26/2022 19:30:02  [facility] [id number]  MARIBEL/

## 2022-11-03 ENCOUNTER — NURSE ONLY (OUTPATIENT)
Dept: SURGERY | Facility: CLINIC | Age: 37
End: 2022-11-03
Payer: COMMERCIAL

## 2022-11-03 PROCEDURE — 99024 POSTOP FOLLOW-UP VISIT: CPT | Performed by: SURGERY

## 2022-11-03 NOTE — PROGRESS NOTES
Stevan Evans is a 40year old female who presents today for a follow-up after removal of bilateral breast tissue expanders, bilateral breast capsulotomy, placement of bilateral silicone implants, and autologous fat grafting on 10/26/22 by Dr. Melinda Anand. She denies fever and chills. She denies nausea, vomiting, diarrhea or constipation. Her pain is controlled. Physical Exam     Surgical incisions are clean, dry, and intact. No erythema, no wound drainage. Breasts: without erythema or sign on hematoma. Mild resolving ecchymosis present. Abdomen: soft and non tender. Without erythema, ecchymosis or hematoma. There were no vitals filed for this visit. Assessment and Plan     Romain Martinez is doing well s/p removal of bilateral breast tissue expanders, bilateral breast capsulotomy, placement of bilateral silicone implants, and autologous fat grafting on 10/26/22 by Dr. Melinda Anand. Bilateral breast incisions were clean dry and intact. Incisions cleaned with an alcohol wipe and new dressing of neosporin, xeroform and tulfa was placed. Abdominal and breast skin irritation is improving. Patient stated that the camisole underneath her abdominal binder. Patient has been compliant with abdominal and breast compression. Patient reports that the abdominal binder provides additional comfort and will continue wearing it with a camisole underneath. Patient reports to take Norco for sleep aide. I instructed her to switch to Tylenol PM. Patient verbalized understanding. Patient will follow up with Dr. Ag Rivera on 11/22/22 for wound check    Questions were answered. Patient understands.      Slava Alonso RN  11/3/2022  1:19 PM

## 2022-11-21 ENCOUNTER — MED REC SCAN ONLY (OUTPATIENT)
Dept: FAMILY MEDICINE CLINIC | Facility: CLINIC | Age: 37
End: 2022-11-21

## 2022-11-21 NOTE — PROGRESS NOTES
Danie Dwyer is a 40year old female who presents today for a follow-up after undergoing exchange of permanent implants on 10/26. She denies fever and chills. She denies nausea, vomiting, diarrhea or constipation. Physical Examination:  Breasts: Bilateral breast incisions are clean dry and intact. There is no erythema or seroma noted. A visible Monocryl suture was noted at the lateral aspect of the left breast scar which was debrided. Abdominal incisions are well-healed. Assessment and Plan:  Patient is doing well. We discussed scar care including massage with moisturizer and silicone products. The patient may slowly resume activity with compression in place. She will follow-up in 6 months for scar check. The plan was reviewed with the patient and questions were answered.

## 2022-11-22 ENCOUNTER — OFFICE VISIT (OUTPATIENT)
Dept: SURGERY | Facility: CLINIC | Age: 37
End: 2022-11-22
Payer: COMMERCIAL

## 2022-11-22 DIAGNOSIS — Z90.13 ABSENCE OF BREAST, BILATERAL: Primary | ICD-10-CM

## 2022-11-22 PROCEDURE — 99024 POSTOP FOLLOW-UP VISIT: CPT | Performed by: SURGERY

## 2023-03-19 ENCOUNTER — PATIENT MESSAGE (OUTPATIENT)
Dept: FAMILY MEDICINE CLINIC | Facility: CLINIC | Age: 38
End: 2023-03-19

## 2023-03-19 DIAGNOSIS — G43.009 MIGRAINE WITHOUT AURA AND WITHOUT STATUS MIGRAINOSUS, NOT INTRACTABLE: Primary | ICD-10-CM

## 2023-03-20 NOTE — TELEPHONE ENCOUNTER
May need to call the pharmacy on this. She is referring to her topiramate. Destiny Whipple for early refill since seems she was short pills.

## 2023-04-19 ENCOUNTER — TELEPHONE (OUTPATIENT)
Dept: SURGERY | Facility: CLINIC | Age: 38
End: 2023-04-19

## 2023-04-19 NOTE — TELEPHONE ENCOUNTER
Patient called to report when she was getting dressed today she felt a \"weird pull\" to the right breast implant and now she believes it is flipped. She states there is a discrepancy in the shape of the bilateral breasts. She denies fever, redness, swelling or pain. A photo was requested and Dr. Lynette Saldivar will be informed. I will call patient after advise is given. Patient was appreciative of the call.

## 2023-04-28 ENCOUNTER — OFFICE VISIT (OUTPATIENT)
Dept: SURGERY | Facility: CLINIC | Age: 38
End: 2023-04-28
Payer: COMMERCIAL

## 2023-04-28 DIAGNOSIS — Z90.13 ABSENCE OF BREAST, BILATERAL: Primary | ICD-10-CM

## 2023-04-28 PROCEDURE — 99212 OFFICE O/P EST SF 10 MIN: CPT | Performed by: SURGERY

## 2023-04-28 NOTE — PROGRESS NOTES
Joan Levine is a 41year old female who presents today for a follow-up. She has history of bilateral obstructing reconstruction with style  cc implants in October 2022. The patient reports that she noticed an acute change in the shape of her right breast.    Physical Examination:  Breasts: Bilateral breast incisions are well-healed. The right breast is noted to have an anterior posterior malposition which was easily manipulated to the proper anatomic configuration. Assessment and Plan:  Patient is doing well. She was instructed monitor for recurrent deformity and was instructed and implant manipulation. She will follow-up in 1 year or sooner if any changes are detected. The plan was reviewed with the patient and questions were answered.

## 2023-05-19 ENCOUNTER — OFFICE VISIT (OUTPATIENT)
Dept: SURGERY | Facility: CLINIC | Age: 38
End: 2023-05-19
Payer: COMMERCIAL

## 2023-05-19 DIAGNOSIS — Z90.13 ABSENCE OF BREAST, BILATERAL: Primary | ICD-10-CM

## 2023-05-19 PROCEDURE — 99212 OFFICE O/P EST SF 10 MIN: CPT | Performed by: SURGERY

## 2023-05-19 NOTE — PROGRESS NOTES
Ld Velez is a 41year old female who presents today for a follow-up. She reports an episode of anterior posterior malposition of her right breast implant which was able to be manipulated to the proper anatomic configuration. She has questions about possible revision surgery. Physical Examination:  Breasts: Bilateral breast incisions are clean dry and intact. Good shape and symmetry is noted. Assessment and Plan:  Patient is doing well. She is instructed to wear compression when performing physical activity. We discussed options for revision surgery which would include capsulorrhaphy and possible implant exchange. The patient was instructed to monitor for the time being and to inform our office should recurrent deformity occur. The plan was reviewed with the patient and questions were answered.

## 2023-05-23 ENCOUNTER — LAB ENCOUNTER (OUTPATIENT)
Dept: LAB | Age: 38
End: 2023-05-23
Attending: FAMILY MEDICINE
Payer: COMMERCIAL

## 2023-05-23 ENCOUNTER — OFFICE VISIT (OUTPATIENT)
Dept: FAMILY MEDICINE CLINIC | Facility: CLINIC | Age: 38
End: 2023-05-23
Payer: COMMERCIAL

## 2023-05-23 VITALS
BODY MASS INDEX: 25.44 KG/M2 | SYSTOLIC BLOOD PRESSURE: 104 MMHG | TEMPERATURE: 98 F | HEART RATE: 80 BPM | DIASTOLIC BLOOD PRESSURE: 64 MMHG | WEIGHT: 149 LBS | RESPIRATION RATE: 16 BRPM | HEIGHT: 64 IN

## 2023-05-23 DIAGNOSIS — Z00.00 LABORATORY EXAMINATION ORDERED AS PART OF A ROUTINE GENERAL MEDICAL EXAMINATION: ICD-10-CM

## 2023-05-23 DIAGNOSIS — R45.86 MOOD CHANGES: ICD-10-CM

## 2023-05-23 DIAGNOSIS — N91.2 AMENORRHEA: ICD-10-CM

## 2023-05-23 DIAGNOSIS — Z12.4 PAPANICOLAOU SMEAR FOR CERVICAL CANCER SCREENING: ICD-10-CM

## 2023-05-23 DIAGNOSIS — Z00.00 ROUTINE GENERAL MEDICAL EXAMINATION AT A HEALTH CARE FACILITY: Primary | ICD-10-CM

## 2023-05-23 DIAGNOSIS — Z13.89 SCREENING FOR GENITOURINARY CONDITION: ICD-10-CM

## 2023-05-23 LAB
ALBUMIN SERPL-MCNC: 4.3 G/DL (ref 3.4–5)
ALBUMIN/GLOB SERPL: 1.8 {RATIO} (ref 1–2)
ALP LIVER SERPL-CCNC: 37 U/L
ALT SERPL-CCNC: 20 U/L
ANION GAP SERPL CALC-SCNC: 4 MMOL/L (ref 0–18)
AST SERPL-CCNC: 12 U/L (ref 15–37)
B-HCG SERPL-ACNC: <1 MIU/ML
BASOPHILS # BLD AUTO: 0.01 X10(3) UL (ref 0–0.2)
BASOPHILS NFR BLD AUTO: 0.2 %
BILIRUB SERPL-MCNC: 0.6 MG/DL (ref 0.1–2)
BUN BLD-MCNC: 8 MG/DL (ref 7–18)
CALCIUM BLD-MCNC: 9.4 MG/DL (ref 8.5–10.1)
CHLORIDE SERPL-SCNC: 113 MMOL/L (ref 98–112)
CHOLEST SERPL-MCNC: 120 MG/DL (ref ?–200)
CO2 SERPL-SCNC: 22 MMOL/L (ref 21–32)
CREAT BLD-MCNC: 1.06 MG/DL
EOSINOPHIL # BLD AUTO: 0.05 X10(3) UL (ref 0–0.7)
EOSINOPHIL NFR BLD AUTO: 1.2 %
ERYTHROCYTE [DISTWIDTH] IN BLOOD BY AUTOMATED COUNT: 13.1 %
ESTRADIOL SERPL-MCNC: 77.3 PG/ML
FASTING PATIENT LIPID ANSWER: NO
FASTING STATUS PATIENT QL REPORTED: NO
FSH SERPL-ACNC: 3 MIU/ML
GFR SERPLBLD BASED ON 1.73 SQ M-ARVRAT: 69 ML/MIN/1.73M2 (ref 60–?)
GLOBULIN PLAS-MCNC: 2.4 G/DL (ref 2.8–4.4)
GLUCOSE BLD-MCNC: 99 MG/DL (ref 70–99)
HCT VFR BLD AUTO: 42.2 %
HDLC SERPL-MCNC: 67 MG/DL (ref 40–59)
HGB BLD-MCNC: 14.5 G/DL
IMM GRANULOCYTES # BLD AUTO: 0.01 X10(3) UL (ref 0–1)
IMM GRANULOCYTES NFR BLD: 0.2 %
LDLC SERPL CALC-MCNC: 42 MG/DL (ref ?–100)
LH SERPL-ACNC: 3.5 MIU/ML
LYMPHOCYTES # BLD AUTO: 1.73 X10(3) UL (ref 1–4)
LYMPHOCYTES NFR BLD AUTO: 40.2 %
MCH RBC QN AUTO: 31.5 PG (ref 26–34)
MCHC RBC AUTO-ENTMCNC: 34.4 G/DL (ref 31–37)
MCV RBC AUTO: 91.7 FL
MONOCYTES # BLD AUTO: 0.31 X10(3) UL (ref 0.1–1)
MONOCYTES NFR BLD AUTO: 7.2 %
NEUTROPHILS # BLD AUTO: 2.19 X10 (3) UL (ref 1.5–7.7)
NEUTROPHILS # BLD AUTO: 2.19 X10(3) UL (ref 1.5–7.7)
NEUTROPHILS NFR BLD AUTO: 51 %
NONHDLC SERPL-MCNC: 53 MG/DL (ref ?–130)
OSMOLALITY SERPL CALC.SUM OF ELEC: 286 MOSM/KG (ref 275–295)
PLATELET # BLD AUTO: 198 10(3)UL (ref 150–450)
POTASSIUM SERPL-SCNC: 4.8 MMOL/L (ref 3.5–5.1)
PROT SERPL-MCNC: 6.7 G/DL (ref 6.4–8.2)
RBC # BLD AUTO: 4.6 X10(6)UL
SODIUM SERPL-SCNC: 139 MMOL/L (ref 136–145)
TRIGL SERPL-MCNC: 46 MG/DL (ref 30–149)
TSI SER-ACNC: 2.06 MIU/ML (ref 0.36–3.74)
VLDLC SERPL CALC-MCNC: 6 MG/DL (ref 0–30)
WBC # BLD AUTO: 4.3 X10(3) UL (ref 4–11)

## 2023-05-23 PROCEDURE — 84443 ASSAY THYROID STIM HORMONE: CPT

## 2023-05-23 PROCEDURE — 83002 ASSAY OF GONADOTROPIN (LH): CPT

## 2023-05-23 PROCEDURE — 3078F DIAST BP <80 MM HG: CPT | Performed by: FAMILY MEDICINE

## 2023-05-23 PROCEDURE — 80053 COMPREHEN METABOLIC PANEL: CPT

## 2023-05-23 PROCEDURE — 85025 COMPLETE CBC W/AUTO DIFF WBC: CPT

## 2023-05-23 PROCEDURE — 90715 TDAP VACCINE 7 YRS/> IM: CPT | Performed by: FAMILY MEDICINE

## 2023-05-23 PROCEDURE — 84702 CHORIONIC GONADOTROPIN TEST: CPT

## 2023-05-23 PROCEDURE — 82670 ASSAY OF TOTAL ESTRADIOL: CPT

## 2023-05-23 PROCEDURE — 83001 ASSAY OF GONADOTROPIN (FSH): CPT

## 2023-05-23 PROCEDURE — 3008F BODY MASS INDEX DOCD: CPT | Performed by: FAMILY MEDICINE

## 2023-05-23 PROCEDURE — 80061 LIPID PANEL: CPT

## 2023-05-23 PROCEDURE — 99213 OFFICE O/P EST LOW 20 MIN: CPT | Performed by: FAMILY MEDICINE

## 2023-05-23 PROCEDURE — 99395 PREV VISIT EST AGE 18-39: CPT | Performed by: FAMILY MEDICINE

## 2023-05-23 PROCEDURE — 90471 IMMUNIZATION ADMIN: CPT | Performed by: FAMILY MEDICINE

## 2023-05-23 PROCEDURE — 87624 HPV HI-RISK TYP POOLED RSLT: CPT | Performed by: FAMILY MEDICINE

## 2023-05-23 PROCEDURE — 88175 CYTOPATH C/V AUTO FLUID REDO: CPT | Performed by: FAMILY MEDICINE

## 2023-05-23 PROCEDURE — 3074F SYST BP LT 130 MM HG: CPT | Performed by: FAMILY MEDICINE

## 2023-05-25 DIAGNOSIS — R77.1 ABNORMALITY OF GLOBULIN: Primary | ICD-10-CM

## 2023-05-30 LAB — HPV I/H RISK 1 DNA SPEC QL NAA+PROBE: NEGATIVE

## 2023-09-26 ENCOUNTER — OFFICE VISIT (OUTPATIENT)
Dept: SURGERY | Facility: CLINIC | Age: 38
End: 2023-09-26
Payer: COMMERCIAL

## 2023-09-26 DIAGNOSIS — Z90.13 ABSENCE OF BREAST, BILATERAL: Primary | ICD-10-CM

## 2023-09-26 PROCEDURE — 99212 OFFICE O/P EST SF 10 MIN: CPT | Performed by: SURGERY

## 2023-09-26 NOTE — PROGRESS NOTES
Trent Bundy is a 41year old female who presents today for a follow-up. She denies fever and chills. She denies nausea, vomiting, diarrhea or constipation. Ports intermittent anterior posterior malposition of her right breast implant which she is able to place in the proper anatomic configuration. Physical Examination:  Breasts: Yes bilateral breast incisions are well-healed. Mild rippling is noted in the upper poles of bilateral breast.  No obvious anterior posterior malposition is noted. Left nipple is approximately 1 cm lower than the right. Assessment and Plan:  Given the recurrent anterior posterior malposition, we discussed the option of bilateral breast capsulorrhaphy, possible implant exchange with slightly larger implant, and fat grafting. We discussed a possible crescentic mastopexy on the left breast the nature of the procedure was reviewed with the patient. We discussed the risks of surgery including but not limited to bleeding, infection, scarring, delayed wound healing, asymmetry, implant malposition, implant infection or extrusion requiring removal, ALCL, capsular contracture, hypertrophic scarring or keloid, injury to intra-abdominal structures, contour abnormalities, cysts or calcifications requiring biopsy, and need for further surgery. We reviewed the expected postoperative course including possible need for drains, as well as need for activity limitation and compression. Multiple questions were answered the patient's satisfaction. No guarantees as to outcome were offered. The patient expresses understanding and wishes to proceed.

## 2023-09-26 NOTE — PATIENT INSTRUCTIONS
Surgeon:         Dr. Manuel Garcia                                        Tel:         [phone]                                  Fax:        [phone]    Surgery/Procedure: Bilateral breast capsulorraphy and bilateral breast implant exchange, autologous fat grafting to the bilateral reconstructed breasts, outpatient, general anesthesia, 2.5 hours    Dx Code: Z90.13    Hospital:  [facility]: [address] [place] [address], [place], Eve Waikele            [phone]  [facility]: [address], [facility]               [phone]    1. Someone will need to drive you to and from the hospital if your procedure is outpatient. 2.Do not drink alcohol or smoke 24 hours prior to your procedure. 3. Bring a picture ID and your insurance card. 4. You will be contacted by the hospital the day before to confirm the procedure time and location. 5. Do not take any herbal supplements or blood thinners at least one week before your procedure/surgery. This includes NSAID's (aspirin, baby aspirin, Motrin, Ibuprofen, Aleve, Advil, Naproxen, etc), Plavix, fish oil, vitamin E, turmeric, CoQ10, or green tea supplements, etc. *TYLENOL or acetaminophen is ok to take*    6. PRE-OPERATIVE TESTING: History and physical with medical clearance is REQUIRED within 30 days of the surgery date and is mandatory per Dr. Adebayo Clayton. *If this is not done, your surgery will be postponed*  MEDICAL CLEARANCE WITH DR. Heath Emanuel  CBC  CMP  EKG    7. Please inform us if you start or change any medications at least one week before surgery (ex: blood thinners, weight loss medications, diabetic medications, herbal supplements, etc)    8. Does patient have diagnosis of sleep apnea?     [   ] Yes     [ X  ]  No    Consent obtained  Photos taken on 9/26/2023

## 2023-09-28 ENCOUNTER — TELEPHONE (OUTPATIENT)
Dept: SURGERY | Facility: CLINIC | Age: 38
End: 2023-09-28

## 2023-09-28 DIAGNOSIS — Z90.13 ABSENCE OF BREAST, BILATERAL: Primary | ICD-10-CM

## 2023-09-28 NOTE — TELEPHONE ENCOUNTER
Calling pt in regards to scheduling surgery. Informed pt that I have 06/19/2024 available at [facility] with Dr. Jeronimo Golden. Pt verbalized understanding and in agreement with date and location. All questions answered. Encouraged pt to call or crossvertise message office with any other questions or concerns.

## 2023-10-12 DIAGNOSIS — G43.009 MIGRAINE WITHOUT AURA AND WITHOUT STATUS MIGRAINOSUS, NOT INTRACTABLE: ICD-10-CM

## 2023-11-06 ENCOUNTER — PATIENT MESSAGE (OUTPATIENT)
Dept: FAMILY MEDICINE CLINIC | Facility: CLINIC | Age: 38
End: 2023-11-06

## 2023-11-06 NOTE — TELEPHONE ENCOUNTER
Nexplanon would also be an option. We don't manage IUD or Nexplanon so her best option would be to schedule with gyne to discuss options other than Nuvaring and OCPs.

## 2024-05-07 ENCOUNTER — TELEPHONE (OUTPATIENT)
Dept: SURGERY | Facility: CLINIC | Age: 39
End: 2024-05-07

## 2024-05-07 ENCOUNTER — TELEPHONE (OUTPATIENT)
Dept: FAMILY MEDICINE CLINIC | Facility: CLINIC | Age: 39
End: 2024-05-07

## 2024-05-07 DIAGNOSIS — Z01.818 PRE-OP EXAM: Primary | ICD-10-CM

## 2024-05-07 NOTE — TELEPHONE ENCOUNTER
Orders placed for pre op appointment, will provide results of CBC and CMP. Will fax them to [phone]

## 2024-05-07 NOTE — TELEPHONE ENCOUNTER
Patient called office to schedule pre op appointment. Paperwork was located in patients chart and printed. Appointment scheduled for 6/10/24 and paperwork given to MA working with Stephanie Dressler.

## 2024-05-07 NOTE — TELEPHONE ENCOUNTER
Patient cancelled her appointment with Dr. Shaw scheduled on 5/7/24. Left voice message to call office back to inquire if patient has any concerns or wishes to schedule a new appointment. Otherwise, per clinical staff, it is ok to proceed with her upcoming case if her concerns have resolved.

## 2024-05-30 ENCOUNTER — APPOINTMENT (OUTPATIENT)
Dept: ADMINISTRATIVE | Facility: HOSPITAL | Age: 39
End: 2024-05-30
Payer: COMMERCIAL

## 2024-06-03 ENCOUNTER — LAB ENCOUNTER (OUTPATIENT)
Dept: LAB | Age: 39
End: 2024-06-03
Attending: FAMILY MEDICINE
Payer: COMMERCIAL

## 2024-06-03 DIAGNOSIS — Z01.818 PRE-OP EXAM: ICD-10-CM

## 2024-06-03 LAB
ALBUMIN SERPL-MCNC: 4.2 G/DL (ref 3.4–5)
ALBUMIN/GLOB SERPL: 2 {RATIO} (ref 1–2)
ALP LIVER SERPL-CCNC: 35 U/L
ALT SERPL-CCNC: 15 U/L
ANION GAP SERPL CALC-SCNC: 5 MMOL/L (ref 0–18)
AST SERPL-CCNC: 16 U/L (ref 15–37)
BASOPHILS # BLD AUTO: 0.01 X10(3) UL (ref 0–0.2)
BASOPHILS NFR BLD AUTO: 0.2 %
BILIRUB SERPL-MCNC: 0.6 MG/DL (ref 0.1–2)
BUN BLD-MCNC: 15 MG/DL (ref 9–23)
CALCIUM BLD-MCNC: 8.4 MG/DL (ref 8.5–10.1)
CHLORIDE SERPL-SCNC: 116 MMOL/L (ref 98–112)
CO2 SERPL-SCNC: 22 MMOL/L (ref 21–32)
CREAT BLD-MCNC: 0.85 MG/DL
EGFRCR SERPLBLD CKD-EPI 2021: 90 ML/MIN/1.73M2 (ref 60–?)
EOSINOPHIL # BLD AUTO: 0.17 X10(3) UL (ref 0–0.7)
EOSINOPHIL NFR BLD AUTO: 3.4 %
ERYTHROCYTE [DISTWIDTH] IN BLOOD BY AUTOMATED COUNT: 13 %
FASTING STATUS PATIENT QL REPORTED: YES
GLOBULIN PLAS-MCNC: 2.1 G/DL (ref 2.8–4.4)
GLUCOSE BLD-MCNC: 83 MG/DL (ref 70–99)
HCT VFR BLD AUTO: 41.5 %
HGB BLD-MCNC: 14.8 G/DL
IMM GRANULOCYTES # BLD AUTO: 0.01 X10(3) UL (ref 0–1)
IMM GRANULOCYTES NFR BLD: 0.2 %
LYMPHOCYTES # BLD AUTO: 1.7 X10(3) UL (ref 1–4)
LYMPHOCYTES NFR BLD AUTO: 34.1 %
MCH RBC QN AUTO: 32 PG (ref 26–34)
MCHC RBC AUTO-ENTMCNC: 35.7 G/DL (ref 31–37)
MCV RBC AUTO: 89.6 FL
MONOCYTES # BLD AUTO: 0.37 X10(3) UL (ref 0.1–1)
MONOCYTES NFR BLD AUTO: 7.4 %
NEUTROPHILS # BLD AUTO: 2.72 X10 (3) UL (ref 1.5–7.7)
NEUTROPHILS # BLD AUTO: 2.72 X10(3) UL (ref 1.5–7.7)
NEUTROPHILS NFR BLD AUTO: 54.7 %
OSMOLALITY SERPL CALC.SUM OF ELEC: 296 MOSM/KG (ref 275–295)
PLATELET # BLD AUTO: 171 10(3)UL (ref 150–450)
POTASSIUM SERPL-SCNC: 4 MMOL/L (ref 3.5–5.1)
PROT SERPL-MCNC: 6.3 G/DL (ref 6.4–8.2)
RBC # BLD AUTO: 4.63 X10(6)UL
SODIUM SERPL-SCNC: 143 MMOL/L (ref 136–145)
WBC # BLD AUTO: 5 X10(3) UL (ref 4–11)

## 2024-06-03 PROCEDURE — 80053 COMPREHEN METABOLIC PANEL: CPT | Performed by: FAMILY MEDICINE

## 2024-06-03 PROCEDURE — 85025 COMPLETE CBC W/AUTO DIFF WBC: CPT | Performed by: FAMILY MEDICINE

## 2024-06-06 ENCOUNTER — OFFICE VISIT (OUTPATIENT)
Dept: FAMILY MEDICINE CLINIC | Facility: CLINIC | Age: 39
End: 2024-06-06
Payer: COMMERCIAL

## 2024-06-06 VITALS
HEART RATE: 73 BPM | OXYGEN SATURATION: 98 % | WEIGHT: 156.38 LBS | HEIGHT: 64 IN | SYSTOLIC BLOOD PRESSURE: 114 MMHG | TEMPERATURE: 98 F | DIASTOLIC BLOOD PRESSURE: 72 MMHG | BODY MASS INDEX: 26.7 KG/M2 | RESPIRATION RATE: 16 BRPM

## 2024-06-06 DIAGNOSIS — R79.9 ABNORMAL BLOOD CHEMISTRY: ICD-10-CM

## 2024-06-06 DIAGNOSIS — Z01.818 PREOPERATIVE CLEARANCE: Primary | ICD-10-CM

## 2024-06-06 PROCEDURE — 3074F SYST BP LT 130 MM HG: CPT | Performed by: FAMILY MEDICINE

## 2024-06-06 PROCEDURE — 99242 OFF/OP CONSLTJ NEW/EST SF 20: CPT | Performed by: FAMILY MEDICINE

## 2024-06-06 PROCEDURE — 3078F DIAST BP <80 MM HG: CPT | Performed by: FAMILY MEDICINE

## 2024-06-06 PROCEDURE — 3008F BODY MASS INDEX DOCD: CPT | Performed by: FAMILY MEDICINE

## 2024-06-06 NOTE — H&P
Goldie Martinez is a 39 year old female who presents for a pre-operative physical exam. Patient is to have bilateral breast capsulorraphy and bilateral breast implant exchange, autologous fat grafting to the bilateral reconstructed breasts, outpatient with general anesthesia for 2.5 hours.  Surgery date 6/19/2024 at [facility] with Dr. Shaw    HPI:   History and physical/medical clearance requested by Dr. Shaw.      No hx of reaction to anesthesia. No family history of severe anesthesia reaction  No hx of DVT or PE.  Hx of asthma years and years ago - no current treatment or symptoms.   Snoring but no sleep apnea.       Current Outpatient Medications   Medication Sig Dispense Refill    topiramate 200 MG Oral Tab TAKE 1 TABLET BY MOUTH EVERY DAY (Patient taking differently: TAKE 1 TABLET BY MOUTH EVERY DAY) 90 tablet 1      Allergies:   Allergies   Allergen Reactions    Other ITCHING     Steri strip  Patient reports after mastectomy steri-strips caused blisters.        Past Medical History:    Anxiety state    Blood disorder    COVID    Loss of smell for 2 days. 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 Rankin - papilloma          Times 1    Oral surgery procedure  2003    [place] Teeth Extraction    Other surgical history  1999    hand surgery x4      Family History   Problem Relation Age of Onset    Ovarian Cancer Paternal Grandmother     Fibromyalgia Mother     Other (chronic lyme disease) Mother     Depression Sister     Renal Disease Sister     Diabetes Father         type 2    Other (Other) Father         Benign breast mass, removed in his 20's    Breast Cancer Paternal Aunt 48        Recurrence    Breast Cancer Paternal Aunt         in her 50's    Anxiety Son     Breast Cancer Paternal Cousin Female          premenopausal    Breast Cancer Paternal Aunt         Great Aunts x 2    Breast Cancer Other         Father's female cousin    Breast Cancer Maternal Aunt         Great aunt    Breast Cancer Maternal Cousin Female         Pre menopausal    Breast Cancer Paternal Cousin Female         Pre menopausal      Social History:   Social History     Socioeconomic History    Marital status:    Tobacco Use    Smoking status: Never    Smokeless tobacco: Never   Vaping Use    Vaping status: Never Used   Substance and Sexual Activity    Alcohol use: Yes     Alcohol/week: 2.0 - 4.0 standard drinks of alcohol     Types: 1 - 2 Glasses of wine, 1 - 2 Cans of beer per week     Comment: has cut down    Drug use: Not Currently     Comment: 1:1 blend of CBD and THC    Sexual activity: Yes     Partners: Male     Birth control/protection: I.U.D.     Comment:    Other Topics Concern    Caffeine Concern Yes     Comment: 1-2 cups daily    Exercise Yes     Comment: 4x weekly    Seat Belt Yes         REVIEW OF SYSTEMS:   GENERAL: feels well otherwise  SKIN: denies any unusual skin lesions. Gets a rash under breasts with heat/sweat.    EYES:denies blurred vision or double vision  HEENT: denies nasal congestion, sinus pain or ST  LUNGS: denies shortness of breath  with exertion  CARDIOVASCULAR: denies chest pain on exertion  GI: denies abdominal pain,denies heartburn  : denies dysuria  MUSCULOSKELETAL: denies back pain  NEURO: denies headaches  PSYCHE: denies depression or anxiety  HEMATOLOGIC: denies hx of anemia  ENDOCRINE: denies thyroid history  ALL/ASTHMA: hx of asthma - no currently treatment.     EXAM:   /72   Pulse 73   Temp 98 °F (36.7 °C) (Temporal)   Resp 16   Ht 5' 4\" (1.626 m)   Wt 156 lb 6.4 oz (70.9 kg)   LMP 05/23/2024 (Approximate)   SpO2 98%   BMI 26.85 kg/m²   GENERAL: well developed, well nourished,in no apparent distress  SKIN: no rashes,no suspicious lesions  HEENT: atraumatic, normocephalic,ears and throat are clear  EYES:PERRLA, EOMI,conjunctiva are clear  NECK: supple,no adenopathy  LUNGS: clear to auscultation  CARDIO: RRR without murmur  GI: no masses, HSM or tenderness  EXTREMITIES: no cyanosis, clubbing or edema  NEURO: Oriented times three,cranial nerves are intact,motor and sensory are grossly intact    ASSESSMENT AND PLAN:   Goldie Martinez is a 39 year old female who presents for a pre-operative physical exam. Patient is to have bilateral breast capsulorraphy and bilateral breast implant exchange, autologous fat grafting to the bilateral reconstructed breasts, outpatient with general anesthesia for 2.5 hours.  Surgery date 6/19/2024 at [facility] with Dr. Shaw.  Pt has the following conditions:  Patient Active Problem List   Diagnosis    Unspecified vitamin D deficiency    Migraine    Anxiety    Absence of breast, bilateral    H/O bilateral mastectomy     Patient is in satisfactory condition and acceptable risk for planned surgery and anesthesia. This consult was sent back the referring physician, Dr. Shaw.

## 2024-06-18 ENCOUNTER — ANESTHESIA EVENT (OUTPATIENT)
Dept: SURGERY | Facility: HOSPITAL | Age: 39
End: 2024-06-18

## 2024-06-19 ENCOUNTER — ANESTHESIA (OUTPATIENT)
Dept: SURGERY | Facility: HOSPITAL | Age: 39
End: 2024-06-19

## 2024-06-19 ENCOUNTER — HOSPITAL ENCOUNTER (OUTPATIENT)
Facility: HOSPITAL | Age: 39
Setting detail: HOSPITAL OUTPATIENT SURGERY
Discharge: HOME | End: 2024-06-19
Attending: SURGERY | Admitting: SURGERY

## 2024-06-19 VITALS
HEART RATE: 67 BPM | BODY MASS INDEX: 25.83 KG/M2 | RESPIRATION RATE: 16 BRPM | WEIGHT: 155 LBS | HEIGHT: 65 IN | TEMPERATURE: 99 F | SYSTOLIC BLOOD PRESSURE: 110 MMHG | DIASTOLIC BLOOD PRESSURE: 62 MMHG | OXYGEN SATURATION: 100 %

## 2024-06-19 DIAGNOSIS — Z90.13 ABSENCE OF BREAST, BILATERAL: Primary | ICD-10-CM

## 2024-06-19 LAB — B-HCG UR QL: NEGATIVE

## 2024-06-19 PROCEDURE — 0HPU0JZ REMOVAL OF SYNTHETIC SUBSTITUTE FROM LEFT BREAST, OPEN APPROACH: ICD-10-PCS | Performed by: SURGERY

## 2024-06-19 PROCEDURE — 0HSXXZZ REPOSITION LEFT NIPPLE, EXTERNAL APPROACH: ICD-10-PCS | Performed by: SURGERY

## 2024-06-19 PROCEDURE — 88305 TISSUE EXAM BY PATHOLOGIST: CPT | Performed by: SURGERY

## 2024-06-19 PROCEDURE — 0HPT0JZ REMOVAL OF SYNTHETIC SUBSTITUTE FROM RIGHT BREAST, OPEN APPROACH: ICD-10-PCS | Performed by: SURGERY

## 2024-06-19 PROCEDURE — 81025 URINE PREGNANCY TEST: CPT

## 2024-06-19 PROCEDURE — 0HBV3ZZ EXCISION OF BILATERAL BREAST, PERCUTANEOUS APPROACH: ICD-10-PCS | Performed by: SURGERY

## 2024-06-19 PROCEDURE — 0HRV0JZ REPLACEMENT OF BILATERAL BREAST WITH SYNTHETIC SUBSTITUTE, OPEN APPROACH: ICD-10-PCS | Performed by: SURGERY

## 2024-06-19 RX ORDER — HYDROMORPHONE HYDROCHLORIDE 1 MG/ML
0.6 INJECTION, SOLUTION INTRAMUSCULAR; INTRAVENOUS; SUBCUTANEOUS EVERY 5 MIN PRN
Status: DISCONTINUED | OUTPATIENT
Start: 2024-06-19 | End: 2024-06-19

## 2024-06-19 RX ORDER — LIDOCAINE HYDROCHLORIDE 10 MG/ML
INJECTION, SOLUTION EPIDURAL; INFILTRATION; INTRACAUDAL; PERINEURAL AS NEEDED
Status: DISCONTINUED | OUTPATIENT
Start: 2024-06-19 | End: 2024-06-19 | Stop reason: SURG

## 2024-06-19 RX ORDER — HYDROCODONE BITARTRATE AND ACETAMINOPHEN 5; 325 MG/1; MG/1
1 TABLET ORAL ONCE AS NEEDED
Status: DISCONTINUED | OUTPATIENT
Start: 2024-06-19 | End: 2024-06-19

## 2024-06-19 RX ORDER — ONDANSETRON 2 MG/ML
INJECTION INTRAMUSCULAR; INTRAVENOUS AS NEEDED
Status: DISCONTINUED | OUTPATIENT
Start: 2024-06-19 | End: 2024-06-19 | Stop reason: SURG

## 2024-06-19 RX ORDER — LIDOCAINE HYDROCHLORIDE AND EPINEPHRINE 10; 10 MG/ML; UG/ML
INJECTION, SOLUTION INFILTRATION; PERINEURAL AS NEEDED
Status: DISCONTINUED | OUTPATIENT
Start: 2024-06-19 | End: 2024-06-19 | Stop reason: HOSPADM

## 2024-06-19 RX ORDER — DEXAMETHASONE SODIUM PHOSPHATE 4 MG/ML
VIAL (ML) INJECTION AS NEEDED
Status: DISCONTINUED | OUTPATIENT
Start: 2024-06-19 | End: 2024-06-19 | Stop reason: SURG

## 2024-06-19 RX ORDER — ROCURONIUM BROMIDE 10 MG/ML
INJECTION, SOLUTION INTRAVENOUS AS NEEDED
Status: DISCONTINUED | OUTPATIENT
Start: 2024-06-19 | End: 2024-06-19 | Stop reason: SURG

## 2024-06-19 RX ORDER — SODIUM CHLORIDE, SODIUM LACTATE, POTASSIUM CHLORIDE, CALCIUM CHLORIDE 600; 310; 30; 20 MG/100ML; MG/100ML; MG/100ML; MG/100ML
INJECTION, SOLUTION INTRAVENOUS CONTINUOUS
Status: DISCONTINUED | OUTPATIENT
Start: 2024-06-19 | End: 2024-06-19

## 2024-06-19 RX ORDER — DOCUSATE SODIUM 100 MG/1
100 CAPSULE, LIQUID FILLED ORAL 2 TIMES DAILY
Qty: 30 CAPSULE | Refills: 1 | Status: SHIPPED | OUTPATIENT
Start: 2024-06-19

## 2024-06-19 RX ORDER — ONDANSETRON 2 MG/ML
4 INJECTION INTRAMUSCULAR; INTRAVENOUS EVERY 6 HOURS PRN
Status: DISCONTINUED | OUTPATIENT
Start: 2024-06-19 | End: 2024-06-19

## 2024-06-19 RX ORDER — HYDROMORPHONE HYDROCHLORIDE 1 MG/ML
0.2 INJECTION, SOLUTION INTRAMUSCULAR; INTRAVENOUS; SUBCUTANEOUS EVERY 5 MIN PRN
Status: DISCONTINUED | OUTPATIENT
Start: 2024-06-19 | End: 2024-06-19

## 2024-06-19 RX ORDER — ONDANSETRON 4 MG/1
4 TABLET, FILM COATED ORAL EVERY 8 HOURS PRN
Qty: 12 TABLET | Refills: 0 | Status: SHIPPED | OUTPATIENT
Start: 2024-06-19

## 2024-06-19 RX ORDER — NALOXONE HYDROCHLORIDE 0.4 MG/ML
0.08 INJECTION, SOLUTION INTRAMUSCULAR; INTRAVENOUS; SUBCUTANEOUS AS NEEDED
Status: DISCONTINUED | OUTPATIENT
Start: 2024-06-19 | End: 2024-06-19

## 2024-06-19 RX ORDER — GLYCOPYRROLATE 0.2 MG/ML
INJECTION, SOLUTION INTRAMUSCULAR; INTRAVENOUS AS NEEDED
Status: DISCONTINUED | OUTPATIENT
Start: 2024-06-19 | End: 2024-06-19 | Stop reason: SURG

## 2024-06-19 RX ORDER — ACETAMINOPHEN 500 MG
1000 TABLET ORAL ONCE AS NEEDED
Status: DISCONTINUED | OUTPATIENT
Start: 2024-06-19 | End: 2024-06-19

## 2024-06-19 RX ORDER — SCOLOPAMINE TRANSDERMAL SYSTEM 1 MG/1
1 PATCH, EXTENDED RELEASE TRANSDERMAL ONCE
Status: DISCONTINUED | OUTPATIENT
Start: 2024-06-19 | End: 2024-06-19 | Stop reason: HOSPADM

## 2024-06-19 RX ORDER — MIDAZOLAM HYDROCHLORIDE 1 MG/ML
INJECTION INTRAMUSCULAR; INTRAVENOUS AS NEEDED
Status: DISCONTINUED | OUTPATIENT
Start: 2024-06-19 | End: 2024-06-19 | Stop reason: SURG

## 2024-06-19 RX ORDER — MIDAZOLAM HYDROCHLORIDE 1 MG/ML
1 INJECTION INTRAMUSCULAR; INTRAVENOUS EVERY 5 MIN PRN
Status: DISCONTINUED | OUTPATIENT
Start: 2024-06-19 | End: 2024-06-19

## 2024-06-19 RX ORDER — NEOSTIGMINE METHYLSULFATE 1 MG/ML
INJECTION, SOLUTION INTRAVENOUS AS NEEDED
Status: DISCONTINUED | OUTPATIENT
Start: 2024-06-19 | End: 2024-06-19 | Stop reason: SURG

## 2024-06-19 RX ORDER — PROCHLORPERAZINE EDISYLATE 5 MG/ML
5 INJECTION INTRAMUSCULAR; INTRAVENOUS EVERY 8 HOURS PRN
Status: DISCONTINUED | OUTPATIENT
Start: 2024-06-19 | End: 2024-06-19

## 2024-06-19 RX ORDER — MEPERIDINE HYDROCHLORIDE 25 MG/ML
12.5 INJECTION INTRAMUSCULAR; INTRAVENOUS; SUBCUTANEOUS AS NEEDED
Status: DISCONTINUED | OUTPATIENT
Start: 2024-06-19 | End: 2024-06-19

## 2024-06-19 RX ORDER — CEPHALEXIN 500 MG/1
500 CAPSULE ORAL 4 TIMES DAILY
Qty: 20 CAPSULE | Refills: 0 | Status: SHIPPED | OUTPATIENT
Start: 2024-06-19 | End: 2024-06-24

## 2024-06-19 RX ORDER — HYDROMORPHONE HYDROCHLORIDE 1 MG/ML
0.4 INJECTION, SOLUTION INTRAMUSCULAR; INTRAVENOUS; SUBCUTANEOUS EVERY 5 MIN PRN
Status: DISCONTINUED | OUTPATIENT
Start: 2024-06-19 | End: 2024-06-19

## 2024-06-19 RX ORDER — HYDROCODONE BITARTRATE AND ACETAMINOPHEN 5; 325 MG/1; MG/1
1-2 TABLET ORAL EVERY 4 HOURS PRN
Qty: 20 TABLET | Refills: 0 | Status: SHIPPED | OUTPATIENT
Start: 2024-06-19

## 2024-06-19 RX ORDER — HYDROCODONE BITARTRATE AND ACETAMINOPHEN 5; 325 MG/1; MG/1
2 TABLET ORAL ONCE AS NEEDED
Status: DISCONTINUED | OUTPATIENT
Start: 2024-06-19 | End: 2024-06-19

## 2024-06-19 RX ORDER — KETAMINE HYDROCHLORIDE 50 MG/ML
INJECTION, SOLUTION INTRAMUSCULAR; INTRAVENOUS AS NEEDED
Status: DISCONTINUED | OUTPATIENT
Start: 2024-06-19 | End: 2024-06-19 | Stop reason: SURG

## 2024-06-19 RX ORDER — ACETAMINOPHEN 500 MG
1000 TABLET ORAL ONCE
Status: DISCONTINUED | OUTPATIENT
Start: 2024-06-19 | End: 2024-06-19 | Stop reason: HOSPADM

## 2024-06-19 RX ADMIN — SODIUM CHLORIDE, SODIUM LACTATE, POTASSIUM CHLORIDE, CALCIUM CHLORIDE: 600; 310; 30; 20 INJECTION, SOLUTION INTRAVENOUS at 07:31:00

## 2024-06-19 RX ADMIN — ONDANSETRON 4 MG: 2 INJECTION INTRAMUSCULAR; INTRAVENOUS at 09:35:00

## 2024-06-19 RX ADMIN — LIDOCAINE HYDROCHLORIDE 50 MG: 10 INJECTION, SOLUTION EPIDURAL; INFILTRATION; INTRACAUDAL; PERINEURAL at 07:34:00

## 2024-06-19 RX ADMIN — DEXAMETHASONE SODIUM PHOSPHATE 8 MG: 4 MG/ML VIAL (ML) INJECTION at 07:39:00

## 2024-06-19 RX ADMIN — MIDAZOLAM HYDROCHLORIDE 2 MG: 1 INJECTION INTRAMUSCULAR; INTRAVENOUS at 07:31:00

## 2024-06-19 RX ADMIN — GLYCOPYRROLATE 0.6 MG: 0.2 INJECTION, SOLUTION INTRAMUSCULAR; INTRAVENOUS at 09:43:00

## 2024-06-19 RX ADMIN — ROCURONIUM BROMIDE 50 MG: 10 INJECTION, SOLUTION INTRAVENOUS at 07:34:00

## 2024-06-19 RX ADMIN — GLYCOPYRROLATE 0.2 MG: 0.2 INJECTION, SOLUTION INTRAMUSCULAR; INTRAVENOUS at 07:34:00

## 2024-06-19 RX ADMIN — KETAMINE HYDROCHLORIDE 25 MG: 50 INJECTION, SOLUTION INTRAMUSCULAR; INTRAVENOUS at 07:34:00

## 2024-06-19 RX ADMIN — NEOSTIGMINE METHYLSULFATE 4 MG: 1 INJECTION, SOLUTION INTRAVENOUS at 09:43:00

## 2024-06-19 RX ADMIN — SODIUM CHLORIDE, SODIUM LACTATE, POTASSIUM CHLORIDE, CALCIUM CHLORIDE: 600; 310; 30; 20 INJECTION, SOLUTION INTRAVENOUS at 10:00:00

## 2024-06-19 RX ADMIN — SODIUM CHLORIDE, SODIUM LACTATE, POTASSIUM CHLORIDE, CALCIUM CHLORIDE: 600; 310; 30; 20 INJECTION, SOLUTION INTRAVENOUS at 09:35:00

## 2024-06-19 NOTE — BRIEF OP NOTE
Pre-Operative Diagnosis: Absence of breast, bilateral [Z90.13]     Post-Operative Diagnosis: Absence of breast, bilateral [Z90.13]      Procedure Performed:   Bilateral breast capsulorrhaphy and bilateral breast implant exchange, autologous fat grafting to the bilateral reconstructed breasts,    Surgeons and Role:     * Cholo Shaw MD - Primary    Assistant(s):  APN: Aniyah Grant APRN     Surgical Findings: Nl     Specimen: Bilateral breast tissue     Estimated Blood Loss: Blood Output: 5 mL (6/19/2024  9:44 AM)        Cholo Shaw MD  6/19/2024  10:02 AM

## 2024-06-19 NOTE — ANESTHESIA PREPROCEDURE EVALUATION
PRE-OP EVALUATION    Patient Name: Goldie Martinez    Admit Diagnosis: Absence of breast, bilateral [Z90.13]    Pre-op Diagnosis: Absence of breast, bilateral [Z90.13]    Bilateral breast capsulorrhaphy and bilateral breast implant exchange, autologous fat grafting to the bilateral reconstructed breasts,    Anesthesia Procedure: Bilateral breast capsulorrhaphy and bilateral breast implant exchange, autologous fat grafting to the bilateral reconstructed breasts, (Bilateral)  . (Bilateral)    Surgeons and Role:     * Cholo Shaw MD - Primary    Pre-op vitals reviewed.  Temp: 97.7 °F (36.5 °C)  Pulse: 74  Resp: 16  BP: 115/71  SpO2: 100 %  Body mass index is 25.79 kg/m².    Current medications reviewed.  Hospital Medications:   [Transfer Hold] acetaminophen (Tylenol Extra Strength) tab 1,000 mg  1,000 mg Oral Once    [Transfer Hold] scopolamine (Transderm-Scop) 1 MG/3DAYS patch 1 patch  1 patch Transdermal Once    lactated ringers infusion   Intravenous Continuous    [COMPLETED] ceFAZolin (Ancef) 2g in 10mL IV syringe premix  2 g Intravenous Once    bacitracin 500 UNIT/GM ointment    PRN    [COMPLETED] ceFAZolin (Ancef) 1 g, gentamicin (Garamycin) 80 mg in sodium chloride 0.9% 1,000 mL irrigation   Irrigation Once (Intra-Op)    [COMPLETED] EPINEPHrine (Adrenalin) 1 mg, lidocaine (Xylocaine) 1 % 50 mL in lactated ringers 1,000 mL tumescent mixture/irrigation   Infiltration Once (Intra-Op)       Outpatient Medications:     Medications Prior to Admission   Medication Sig Dispense Refill Last Dose    topiramate 200 MG Oral Tab TAKE 1 TABLET BY MOUTH EVERY DAY (Patient taking differently: TAKE 1 TABLET BY MOUTH EVERY DAY) 90 tablet 1 6/18/2024       Allergies: Other      Anesthesia Evaluation    Patient summary reviewed.    Anesthetic Complications  (-) history of anesthetic complications         GI/Hepatic/Renal      (-) GERD                           Cardiovascular        Exercise tolerance: good     MET:  >4    (-) obesity  (-) hypertension     (-) CAD                  (-) angina     (-) ELLISON         Endo/Other      (-) diabetes                            Pulmonary      (-) asthma  (-) COPD       (-) shortness of breath     (-) sleep apnea       Neuro/Psych        (+) anxiety                      Patient Active Problem List:     Unspecified vitamin D deficiency     Migraine     Anxiety     Absence of breast, bilateral     H/O bilateral mastectomy            Past Surgical History:   Procedure Laterality Date    Appendectomy  [address]    Breast biopsy Right 2016    Procedure: BREAST BIOPSY;  Surgeon: Priyanka Rankin MD;  Location: [facility] MAIN OR    Breast reconstruc w tiss expandr      Margie localization wire 1 site right (cpt=[id number])  2016    Benign, papilloma    Mastectomy left      Mastectomy right      Needle biopsy right  2016    David-cut with Dr. Rankin - papilloma          Times 1    Oral surgery procedure      [place] Teeth Extraction    Other surgical history  1999    hand surgery x4     Social History     Socioeconomic History    Marital status:    Tobacco Use    Smoking status: Never    Smokeless tobacco: Never   Vaping Use    Vaping status: Never Used   Substance and Sexual Activity    Alcohol use: Yes     Alcohol/week: 2.0 - 4.0 standard drinks of alcohol     Types: 1 - 2 Glasses of wine, 1 - 2 Cans of beer per week     Comment: has cut down    Drug use: Not Currently     Comment: 1:1 blend of CBD and THC    Sexual activity: Yes     Partners: Male     Birth control/protection: I.U.D.     Comment:    Other Topics Concern    Caffeine Concern Yes     Comment: 1-2 cups daily    Exercise Yes     Comment: 4x weekly    Seat Belt Yes     History   Drug Use Unknown     Comment: 1:1 blend of CBD and THC     Available pre-op labs reviewed.  Lab Results   Component Value Date    WBC 5.0 2024    RBC 4.63 2024    HGB 14.8 2024    HCT 41.5 2024    MCV  89.6 06/03/2024    MCH 32.0 06/03/2024    MCHC 35.7 06/03/2024    RDW 13.0 06/03/2024    .0 06/03/2024     Lab Results   Component Value Date     06/03/2024    K 4.0 06/03/2024     (H) 06/03/2024    CO2 22.0 06/03/2024    BUN 15 06/03/2024    CREATSERUM 0.85 06/03/2024    GLU 83 06/03/2024    CA 8.4 (L) 06/03/2024            Airway      Mallampati: II  Mouth opening: >3 FB  TM distance: 4 - 6 cm  Neck ROM: full Cardiovascular    Cardiovascular exam normal.  Rhythm: regular  Rate: normal     Dental  Comment: Patient denies any loose/missing/cracked teeth. No gross abnormalities or loose teeth noted on exam.      Dentition appears grossly intact         Pulmonary    Pulmonary exam normal.                 Other findings              ASA: 2   Plan: general  NPO status verified and patient meets guidelines.    Post-procedure pain management plan discussed with surgeon and patient.    Comment:     A detailed discussion about the anesthetic plan was held with Goldie Martinez in the preoperative area. Discussed benefits and risks of general anesthesia including, reasonable expectations of post-operative pain, nausea, vomiting, injury to lips, gums, tongue, teeth, eyes, awareness under anesthesia, cardiac, pulmonary, aspiration, stroke, and death. All questions were answered appropriately and patient demonstrated understanding of realistic expectations and risks of undergoing anesthesia. Goldie Martinez consents to receiving anesthesia and wishes to proceed.      Plan/risks discussed with: patient                Present on Admission:  **None**

## 2024-06-19 NOTE — OPERATIVE REPORT
[facility]    PATIENT'S NAME: SARINA CLAIRE   ATTENDING PHYSICIAN: Cholo Shaw M.D.   OPERATING PHYSICIAN: Cholo Shaw M.D.   PATIENT ACCOUNT#:   [id number]    LOCATION:  [facility] [address]  MEDICAL RECORD #:   [id number]       YOB: 1985  ADMISSION DATE:       06/19/2024      OPERATION DATE:  06/19/2024    OPERATIVE REPORT    PREOPERATIVE DIAGNOSIS:  History of bilateral mastectomy and implant reconstruction.  POSTOPERATIVE DIAGNOSIS:  History of bilateral mastectomy and implant reconstruction.  PROCEDURE:    1.   Bilateral breast capsulectomy and capsulorrhaphy.  2.   Bilateral breast implant exchange.  3.   Autologous fat grafting to bilateral reconstructed breast.  4.   Left crescentic mastopexy.    ASSISTANT:  TRENTON Hall    ANESTHESIA:  General.    ESTIMATED BLOOD LOSS:  Minimal.    COMPLICATIONS:  None.    INDICATIONS:  The patient is a 39-year-old female who presented with bilateral nipple-sparing mastectomy and implant-based reconstruction.  The patient has had recurrent anterior-posterior malposition of her left breast implant.  We discussed the option of bilateral capsulorrhaphy, implant exchange, fat grafting, and possible mastopexy.  The risks, benefits, and alternatives were reviewed with the patient.  She expressed understanding and wished to proceed.    FINDINGS:  Bilateral breasts reconstructed with Natrelle Inspira SoftTouch breast implant, style SSF, 605 mL, reference SSF-605; on the right, serial number [id number]; on the left, serial number [id number].    OPERATIVE TECHNIQUE:  Informed consent was obtained from the patient.  The risks, benefits, and alternatives were reviewed with the patient preoperatively.  She expressed understanding and wished to proceed.  The patient was marked in the preoperative holding area in the upright position.  The midline and inframammary folds were marked.  Areas to be fat grafted were marked.  Areas of central  abdominal lipodystrophy were marked for liposuction.  The patient was then taken to the operating room, properly identified, placed in a supine position.  Sequential compression devices were placed on bilateral lower extremities.  Intravenous antibiotic prophylaxis was administered.  The patient then underwent successful induction of general anesthesia and endotracheal intubation.  The arms were placed abducted on foam-padded cradles and loosely secured with Kerlix.  The chest and abdomen were prepped and draped sterilely.  The previous liposuction port sites were infiltrated with 1% lidocaine with epinephrine.  The stab incisions were then created.  Then, 1 L of tumescent solution was infiltrated into the central abdomen.  While the tumescent was taking effect, attention was turned to the breast.  Both inframammary fold scars were infiltrated with 1% lidocaine without epinephrine.  The procedure began on the right breast.  The scar was excised and sent for permanent pathologic analysis.  A superior subcutaneous flap was then elevated sharply.  A transverse capsulotomy was then created and an intact implant was removed.  The implant was then replaced, and the proposed lateral capsulorrhaphy was marked externally, and the markings were then transposed internally where a crescentic excision in the lateral capsule was performed with electrocautery.  The excised capsule was then sent for permanent pathologic analysis.  The resultant defect was repaired with 2 layers of running 2-0 Vicryl sutures.  Once the pocket had been adequately corrected, attention was turned to the left breast.  In a similar fashion, the left inframammary fold scar was excised for permanent pathologic analysis.  A superior subcutaneous flap was then elevated.  A transverse capsulotomy was then created and an intact implant was removed.  Again, a lateral capsulorrhaphy was marked externally, the markings transposed internally, and a crescentic  excision of the lateral capsule was then performed and sent for permanent pathologic analysis.  The resulting capsular defect was repaired with 2 layers of running 2-0 Vicryl suture.  Attention was turned to the abdomen.  Using a 4 mm Mercedes tip cannula, a power-assisted liposuction of the central abdomen was performed.  Approximately 100 mL of lipoaspirate was collected using the [employer] system.  The fat was processed in the usual fashion, and the liposuction port sites were closed with 3-0 Vicryl deep dermal sutures.  With the patient in the upright position and sizers in place, the fat was grafted to bilateral breasts via the existing incisions.  A total of 27 mL was grafted to the left breast and 18 mL was grafted to the right breast.  Next, the capsule was released superiorly and medially along the base with electrocautery to facilitate medialization of the pocket.  Sizers of different volumes and projections were placed.  Ultimately, it appeared that style  mL implant gave the best size and shape in accordance with the patient's desires.  Both pockets were rinsed with Betadine and then antibiotic irrigation until clear.  Hemostasis was checked and noted adequate.  The surgical site was isolated with Ioban and gloves were changed.  The implants were brought on the field and immediately bathed in antibiotic irrigation.  They were checked for integrity and noted to be intact.  The implants were placed in the prepectoral space taking care to maintain the proper orientation.  The capsules were closed with a running 2-0 Vicryl sutures.  The deep dermis with 3-0 Vicryl, and the skin with 4-0 Monocryl subcuticular suture.  The patient was then placed in the upright position.  Persistent asymmetry in the nipple position was noted, and hence, a crescentic supra-areolar excision was tailor tacked and marked on the left breast.  The skin and subcutaneous tissues were then excised with a scalpel and sent for  permanent pathologic analysis.  The nipple was then transposed superiorly, and the skin was repaired with 3-0 Vicryl deep sutures and 4-0 Monocryl subcuticular suture.  Bacitracin and Xeroform were placed on the incisions.  TopiFoam and abdominal binder were placed in the abdomen.  Fluff gauze and a bra were placed on the breast.  The patient was awakened, extubated, and taken to the recovery area in stable condition.  There were no operative complications.  All needle, sponge, and instrument counts were correct.    Dictated By Cholo Shaw M.D.  d: 06/19/2024 10:14:10  t: 06/19/2024 16:50:01  [facility] [id number]  [facility]/

## 2024-06-19 NOTE — ANESTHESIA POSTPROCEDURE EVALUATION
[facility]    Goldie Martinez Patient Status:  Hospital Outpatient Surgery   Age/Gender 39 year old female MRN [id number]   Location [facility] SURGERY Attending Cholo Shaw MD   Hosp Day # 0 PCP Jarrod Vaughn MD       Anesthesia Post-op Note    Bilateral breast capsulorrhaphy and bilateral breast implant exchange, autologous fat grafting to the bilateral reconstructed breasts,    Procedure Summary       Date: 06/19/24 Room / Location:  MAIN OR 02 / [facility] MAIN OR    Anesthesia Start: 0731 Anesthesia Stop: 1000    Procedures:       Bilateral breast capsulorrhaphy and bilateral breast implant exchange, autologous fat grafting to the bilateral reconstructed breasts, (Bilateral)      . (Bilateral) Diagnosis:       Absence of breast, bilateral      (Absence of breast, bilateral [Z90.13])    Surgeons: Cholo Shaw MD Anesthesiologist: Juan Fajardo MD    Anesthesia Type: general ASA Status: 2            Anesthesia Type: general    Vitals Value Taken Time   BP 96/80 06/19/24 1000   Temp 98.3 06/19/24 1000   Pulse 110 06/19/24 1000   Resp 20 06/19/24 1000   SpO2 98 06/19/24 1000       Patient Location: PACU    Anesthesia Type: general    Airway Patency: patent and extubated    Postop Pain Control: adequate    Mental Status: preanesthetic baseline    Nausea/Vomiting: none    Cardiopulmonary/Hydration status: stable euvolemic    Complications: no apparent anesthesia related complications    Postop vital signs: stable    Dental Exam: Unchanged from Preop    Patient to be discharged from PACU when criteria met.

## 2024-06-19 NOTE — DISCHARGE INSTRUCTIONS
Plastic Surgery Home Care Instructions      We hope you were pleased with your care at [facility].  We wish you the best outcome and overall experience with your operation.  These instructions will help to minimize pain, optimize healing, and improve the likelihood of a successful result.    What To Expect  There will be some spotting of the incision lines for the next few days.  Your breasts will be swollen and might feel congested for the next 1-2 weeks  Temporary areas of numbness are typical in the early weeks following a breast procedure.  Normalization of sensation can typically take up to several months following the operation.  Mild bruising, mild swelling and discomfort in the areas of fat grafting is typical.   Please DO NOT apply heat or ice to the affected area    Bandages (Dressing)  Keep dressings clean and dry  Please change dressings once daily:   Apply thin layer of Neosporin/Bacitracin ointment to breast incisions  Apply Xeroform (Yellow Gauze) to each breast incision  Apply Curad (White Non-stick Dressing) to cover the Xeroform so bra does not get sticky  Do not remove your bra unless for hygiene purposes - compression is key - especially at night. You can change to a supportive sports bra or camilsole if this provides good compression and you are more comfortable in that rather than the surgical bra. No underwire.   You are to wear your bra 24/7 for 6 weeks post-operatively.   Reinforce the dressing with insertion of additional padding as needed - this is not required - for your comfort only  Wear abdominal binder, foam and ace wraps at all times for at least 3 days after surgery. After this, you can change to your own compression garments if they area more comfortable (such as Spanx etc). Wear some sort of abdominal and thigh compression at all times for at least 7 days after surgery. After 7 days, abdominal and thigh compression is not medically necessary, but compression can continue to  help with swelling and prevent contour irregularities when worn for 6 weeks postoperatively.     Bathing/Showers  You can resume showering tomorrow. Let the soap and water run over incisions, do not scrub.   No baths, swimming, or hot tubs until you receive medical permission    Pain Medication: Norco  Take one or two tablets every six hours as needed for pain.  Do not take narcotics, if you do not have pain. You can take Tylenol if you are not taking Norco. DO NOT take both Tylenol and Norco together as there is already Tylenol in the Norco.  Keep stools soft.  Take a stool softener (Metamucil, Milk of Magnesia, Colace).  Eating fruit will also help to prevent constipation    Antibiotics: Keflex 4x/day for 7 days  Antibiotics will help minimize the risk of a wound infection  Fill the prescription as directed   Follow the instructions as written on the bottle's label  Call our office if you experience nausea, rash, or other symptoms which might be a possible side effect.    Over-The-Counter Medication  Non-prescription anti-inflammatory medications can also help to ease the pain.  You can take Aleve or ibuprofen starting 48 hours after surgery  Take as directed on the bottle  Drink a full glass of water with the medication    Home Medication  Resume your home medications as instructed  Do not resume herbal medications for two weeks    Diet  Resume your normal diet    Activity  No strenuous activity or heavy lifting (no lifting over 10 pounds)  No activities that involve your pectoralis muscles (no planks, push-ups, etc)  You can go up and down the stairs as tolerated.   You cannot return to work, if your work requires strenuous activity.  You cannot return to physical exercise, sports, or gym workouts until you are allowed to participate in strenuous activity.    Driving  Do not drive, if you are taking pain medication.    Return to Work or School  You can return to work when you are not taking pain medication, if  your work does not involve strenuous activity.  Contact the office, if you need a medical note.     Follow-up Appointment   Our office will call you to set up a time    Questions or Concerns  Call Dr. Shaw's office if you experience severe pain not controlled by pain medication, swelling, numbness, tingling, bleeding, fever, or other concerns.   If he is not available, another physician will be able to address your concerns.    Goldie     Thank you for coming to [facility] for your operation.  The nurses and the anesthesiologist try very hard to make sure you receive the best care possible.  Your trust in them is greatly appreciated.      Thanks so much,  Dr. Valeria Grant, FNP-C

## 2024-06-19 NOTE — ANESTHESIA PROCEDURE NOTES
Airway  Date/Time: 6/19/2024 7:36 AM  Urgency: elective    Airway not difficult    General Information and Staff    Patient location during procedure: OR  Anesthesiologist: Juan Fajardo MD  Resident/CRNA: Vijay Guzmán CRNA  Performed: CRNA   Performed by: Vijay Guzmán CRNA  Authorized by: Juan Fajardo MD      Indications and Patient Condition  Indications for airway management: anesthesia  Spontaneous Ventilation: absent  Sedation level: deep  Preoxygenated: yes  Patient position: sniffing  MILS maintained throughout  Mask difficulty assessment: 1 - vent by mask  Planned trial extubation    Final Airway Details  Final airway type: endotracheal airway      Successful airway: ETT  Cuffed: yes   Successful intubation technique: direct laryngoscopy  Facilitating devices/methods: intubating stylet  Endotracheal tube insertion site: oral  Blade: Nathan  Blade size: #2  ETT size (mm): 7.0    Cormack-Lehane Classification: grade I - full view of glottis  Placement verified by: capnometry   Cuff volume (mL): 6  Measured from: lips  ETT to lips (cm): 21  Number of attempts at approach: 1  Number of other approaches attempted: 0    Additional Comments  Dentition per pre op

## 2024-06-19 NOTE — H&P
No change in Stephanie Dressler, PA's H&P from 6/6.  We reviewed the plan for bilateral breast capsulorrhaphy, implant exchange with slightly larger implant, fat grafting, and possible crescentic mastopexy on the left breast.The nature of the procedure was reviewed with the patient.  We reviewed the risks of surgery including but not limited to bleeding, infection, scarring, delayed wound healing, asymmetry, implant malposition, implant infection or extrusion requiring removal, ALCL, capsular contracture, hypertrophic scarring or keloid, injury to intra-abdominal structures, contour abnormalities, cysts or calcifications requiring biopsy, and need for further surgery.  We reviewed the expected postoperative course including possible need for drains, as well as need for activity limitation and compression.  Multiple questions were answered the patient's satisfaction.  No guarantees as to outcome were offered.  The patient expresses understanding and wishes to proceed.

## 2024-06-28 ENCOUNTER — OFFICE VISIT (OUTPATIENT)
Dept: SURGERY | Facility: CLINIC | Age: 39
End: 2024-06-28

## 2024-06-28 DIAGNOSIS — Z90.13 ABSENCE OF BREAST, BILATERAL: Primary | ICD-10-CM

## 2024-06-28 PROCEDURE — 99024 POSTOP FOLLOW-UP VISIT: CPT

## 2024-06-28 NOTE — PROGRESS NOTES
Goldie Martinez is a 39 year old female who presents today for a follow-up after bilateral breast capsulectomy and capsulorrhaphy, bilateral breast implant exchange (Natrelle Inspira SoftTouch breast implant, style SSF, 605 mL),  autologous fat grafting to bilateral reconstructed breast, left crescentic mastopexy.    She denies fever and chills. She denies nausea, vomiting, diarrhea or constipation.   Her pain is controlled.      Physical Exam     Breasts: Bilateral breast incisions are clean, dry, intact.  There is no evidence of hematoma or seroma bilaterally.  Mastectomy skin is without erythema.  On the left breast at the mid dual aspect of the incision there is a previous suture abscess.  No evidence of open wound or wound drainage.    Abdomen: Soft and appropriately tender.  No evidence of hematoma or seroma at the fat grafting incision sites.    There were no vitals filed for this visit.      Assessment and Plan     Goldie Martinez is doing well s/p bilateral breast capsulectomy and capsulorrhaphy, bilateral breast implant exchange (Natrelle Inspira SoftTouch breast implant, style SSF, 605 mL),  autologous fat grafting to bilateral reconstructed breast, left crescentic mastopexy.    Patient is here today for wound check.  The bilateral breast incisions were redressed with Neosporin, Xeroform, Telfa.  The enhancement dressing is used for this patient due to her adhesive reaction.  She is sending me an updated photo of her incisions in 1 week.  She is then following up with Dr. Shaw on 7-30.  She was encouraged to contact the office with any questions or concerns.    Questions were answered. Patient understands.     MALCOLM Hall  6/28/2024  12:24 PM

## 2024-07-01 ENCOUNTER — LAB ENCOUNTER (OUTPATIENT)
Dept: LAB | Age: 39
End: 2024-07-01
Attending: FAMILY MEDICINE
Payer: COMMERCIAL

## 2024-07-01 DIAGNOSIS — R79.9 ABNORMAL BLOOD CHEMISTRY: ICD-10-CM

## 2024-07-01 LAB
ALBUMIN SERPL-MCNC: 4.1 G/DL (ref 3.4–5)
ALBUMIN/GLOB SERPL: 1.7 {RATIO} (ref 1–2)
ALP LIVER SERPL-CCNC: 35 U/L
ALT SERPL-CCNC: 21 U/L
ANION GAP SERPL CALC-SCNC: 7 MMOL/L (ref 0–18)
AST SERPL-CCNC: 6 U/L (ref 15–37)
BILIRUB SERPL-MCNC: 0.6 MG/DL (ref 0.1–2)
BUN BLD-MCNC: 13 MG/DL (ref 9–23)
CALCIUM BLD-MCNC: 8.8 MG/DL (ref 8.5–10.1)
CHLORIDE SERPL-SCNC: 114 MMOL/L (ref 98–112)
CO2 SERPL-SCNC: 20 MMOL/L (ref 21–32)
CREAT BLD-MCNC: 0.93 MG/DL
EGFRCR SERPLBLD CKD-EPI 2021: 81 ML/MIN/1.73M2 (ref 60–?)
FASTING STATUS PATIENT QL REPORTED: YES
GLOBULIN PLAS-MCNC: 2.4 G/DL (ref 2.8–4.4)
GLUCOSE BLD-MCNC: 100 MG/DL (ref 70–99)
IGA SERPL-MCNC: 83.9 MG/DL (ref 70–312)
IGM SERPL-MCNC: 83.9 MG/DL (ref 43–279)
IMMUNOGLOBULIN PNL SER-MCNC: 523 MG/DL (ref 791–1643)
OSMOLALITY SERPL CALC.SUM OF ELEC: 292 MOSM/KG (ref 275–295)
POTASSIUM SERPL-SCNC: 3.6 MMOL/L (ref 3.5–5.1)
PROT SERPL-MCNC: 6.5 G/DL (ref 6.4–8.2)
SODIUM SERPL-SCNC: 141 MMOL/L (ref 136–145)

## 2024-07-01 PROCEDURE — 80053 COMPREHEN METABOLIC PANEL: CPT | Performed by: FAMILY MEDICINE

## 2024-07-01 PROCEDURE — 82784 ASSAY IGA/IGD/IGG/IGM EACH: CPT | Performed by: FAMILY MEDICINE

## 2024-07-08 ENCOUNTER — TELEPHONE (OUTPATIENT)
Dept: FAMILY MEDICINE CLINIC | Facility: CLINIC | Age: 39
End: 2024-07-08

## 2024-07-08 NOTE — TELEPHONE ENCOUNTER
Patient states she was told to call back and let Stephanie Dressler know if she was able to get in with Dr. Grant. Patient has upcoming appointment

## 2024-07-10 ENCOUNTER — LAB ENCOUNTER (OUTPATIENT)
Dept: LAB | Age: 39
End: 2024-07-10
Attending: ALLERGY & IMMUNOLOGY
Payer: COMMERCIAL

## 2024-07-10 DIAGNOSIS — D84.9 IMMUNOSUPPRESSION-RELATED INFECTIOUS DISEASE (HCC): Primary | ICD-10-CM

## 2024-07-10 DIAGNOSIS — B99.8 IMMUNOSUPPRESSION-RELATED INFECTIOUS DISEASE (HCC): Primary | ICD-10-CM

## 2024-07-10 LAB
ALBUMIN SERPL-MCNC: 4.2 G/DL (ref 3.4–5)
ALBUMIN/GLOB SERPL: 1.8 {RATIO} (ref 1–2)
ALP LIVER SERPL-CCNC: 41 U/L
ALT SERPL-CCNC: 21 U/L
ANION GAP SERPL CALC-SCNC: 9 MMOL/L (ref 0–18)
AST SERPL-CCNC: 9 U/L (ref 15–37)
BASOPHILS # BLD AUTO: 0.03 X10(3) UL (ref 0–0.2)
BASOPHILS NFR BLD AUTO: 0.8 %
BILIRUB SERPL-MCNC: 0.6 MG/DL (ref 0.1–2)
BUN BLD-MCNC: 16 MG/DL (ref 9–23)
C3 SERPL-MCNC: 86.8 MG/DL (ref 90–180)
C4 SERPL-MCNC: 19.1 MG/DL (ref 10–40)
CALCIUM BLD-MCNC: 8.8 MG/DL (ref 8.5–10.1)
CHLORIDE SERPL-SCNC: 113 MMOL/L (ref 98–112)
CO2 SERPL-SCNC: 20 MMOL/L (ref 21–32)
CREAT BLD-MCNC: 0.99 MG/DL
EGFRCR SERPLBLD CKD-EPI 2021: 75 ML/MIN/1.73M2 (ref 60–?)
EOSINOPHIL # BLD AUTO: 0.13 X10(3) UL (ref 0–0.7)
EOSINOPHIL NFR BLD AUTO: 3.7 %
ERYTHROCYTE [DISTWIDTH] IN BLOOD BY AUTOMATED COUNT: 12.6 %
FASTING STATUS PATIENT QL REPORTED: YES
GLOBULIN PLAS-MCNC: 2.4 G/DL (ref 2.8–4.4)
GLUCOSE BLD-MCNC: 91 MG/DL (ref 70–99)
HCT VFR BLD AUTO: 41.8 %
HGB BLD-MCNC: 14.8 G/DL
IGM SERPL-MCNC: 76.9 MG/DL (ref 43–279)
IMM GRANULOCYTES # BLD AUTO: 0.01 X10(3) UL (ref 0–1)
IMM GRANULOCYTES NFR BLD: 0.3 %
IMMUNOGLOBULIN PNL SER-MCNC: 519 MG/DL (ref 791–1643)
LYMPHOCYTES # BLD AUTO: 1.45 X10(3) UL (ref 1–4)
LYMPHOCYTES NFR BLD AUTO: 41 %
MCH RBC QN AUTO: 31.8 PG (ref 26–34)
MCHC RBC AUTO-ENTMCNC: 35.4 G/DL (ref 31–37)
MCV RBC AUTO: 89.9 FL
MONOCYTES # BLD AUTO: 0.29 X10(3) UL (ref 0.1–1)
MONOCYTES NFR BLD AUTO: 8.2 %
NEUTROPHILS # BLD AUTO: 1.63 X10 (3) UL (ref 1.5–7.7)
NEUTROPHILS # BLD AUTO: 1.63 X10(3) UL (ref 1.5–7.7)
NEUTROPHILS NFR BLD AUTO: 46 %
OSMOLALITY SERPL CALC.SUM OF ELEC: 295 MOSM/KG (ref 275–295)
PLATELET # BLD AUTO: 186 10(3)UL (ref 150–450)
POTASSIUM SERPL-SCNC: 4 MMOL/L (ref 3.5–5.1)
PROT SERPL-MCNC: 6.6 G/DL (ref 6.4–8.2)
RBC # BLD AUTO: 4.65 X10(6)UL
SODIUM SERPL-SCNC: 142 MMOL/L (ref 136–145)
WBC # BLD AUTO: 3.5 X10(3) UL (ref 4–11)

## 2024-07-10 PROCEDURE — 86762 RUBELLA ANTIBODY: CPT

## 2024-07-10 PROCEDURE — 86648 DIPHTHERIA ANTIBODY: CPT

## 2024-07-10 PROCEDURE — 82785 ASSAY OF IGE: CPT

## 2024-07-10 PROCEDURE — 86364 TISS TRNSGLTMNASE EA IG CLAS: CPT

## 2024-07-10 PROCEDURE — 82784 ASSAY IGA/IGD/IGG/IGM EACH: CPT

## 2024-07-10 PROCEDURE — 84165 PROTEIN E-PHORESIS SERUM: CPT

## 2024-07-10 PROCEDURE — 80053 COMPREHEN METABOLIC PANEL: CPT

## 2024-07-10 PROCEDURE — 86160 COMPLEMENT ANTIGEN: CPT

## 2024-07-10 PROCEDURE — 83521 IG LIGHT CHAINS FREE EACH: CPT

## 2024-07-10 PROCEDURE — 86317 IMMUNOASSAY INFECTIOUS AGENT: CPT

## 2024-07-10 PROCEDURE — 86334 IMMUNOFIX E-PHORESIS SERUM: CPT

## 2024-07-10 PROCEDURE — 85025 COMPLETE CBC W/AUTO DIFF WBC: CPT

## 2024-07-10 PROCEDURE — 86774 TETANUS ANTIBODY: CPT

## 2024-07-10 PROCEDURE — 86162 COMPLEMENT TOTAL (CH50): CPT

## 2024-07-10 PROCEDURE — 82787 IGG 1 2 3 OR 4 EACH: CPT

## 2024-07-10 PROCEDURE — 86900 BLOOD TYPING SEROLOGIC ABO: CPT

## 2024-07-11 LAB
CH50 COMPLEMENT: 60 U/ML
IGG SUBCLASS 1: 267 MG/DL
IGG SUBCLASS 2: 171 MG/DL
IGG SUBCLASS 3: 37 MG/DL
IGG SUBCLASS 4: 3 MG/DL
RUBELLA IGM ABS: <20 AU/ML
RUBV IGG SER-ACNC: 7.6 IU/ML (ref 10–?)
TTG IGA SER-ACNC: <0.2 U/ML (ref ?–7)

## 2024-07-12 LAB
ALBUMIN SERPL ELPH-MCNC: 4.46 G/DL (ref 3.75–5.21)
ALBUMIN/GLOB SERPL: 2.3 {RATIO} (ref 1–2)
ALPHA1 GLOB SERPL ELPH-MCNC: 0.26 G/DL (ref 0.19–0.46)
ALPHA2 GLOB SERPL ELPH-MCNC: 0.54 G/DL (ref 0.48–1.05)
B-GLOBULIN SERPL ELPH-MCNC: 0.6 G/DL (ref 0.68–1.23)
DIPHTHERIA ANTITOXOID AB: 0.48 IU/ML
GAMMA GLOB SERPL ELPH-MCNC: 0.54 G/DL (ref 0.62–1.7)
KAPPA LC FREE SER-MCNC: 0.81 MG/DL (ref 0.33–1.94)
KAPPA LC FREE/LAMBDA FREE SER NEPH: 0.78 {RATIO} (ref 0.26–1.65)
LAMBDA LC FREE SERPL-MCNC: 1.04 MG/DL (ref 0.57–2.63)
PROT SERPL-MCNC: 6.4 G/DL (ref 5.7–8.2)
TETANUS ANTITOXOID IGG AB: 3.22 IU/ML

## 2024-07-15 LAB
ANTIBODY SCREEN: NEGATIVE
IGE SERPL-ACNC: 3.17 KU/L (ref 2–214)
PNEU AB TYPE 1*: 0.5 UG/ML
PNEU AB TYPE 12 (12F)*: <0.1 UG/ML
PNEU AB TYPE 14*: 0.4 UG/ML
PNEU AB TYPE 17 (17F)*: 0.5 UG/ML
PNEU AB TYPE 19 (19F)*: 0.4 UG/ML
PNEU AB TYPE 2*: <0.2 UG/ML
PNEU AB TYPE 20*: 0.4 UG/ML
PNEU AB TYPE 22 (22F)*: 0.2 UG/ML
PNEU AB TYPE 23 (23F)*: 0.2 UG/ML
PNEU AB TYPE 26 (6B)*: 0.2 UG/ML
PNEU AB TYPE 3*: <0.1 UG/ML
PNEU AB TYPE 34 (10A)*: <0.1 UG/ML
PNEU AB TYPE 4*: 0.3 UG/ML
PNEU AB TYPE 43 (11A)*: 0.1 UG/ML
PNEU AB TYPE 5*: <0.1 UG/ML
PNEU AB TYPE 51 (7F)*: <0.1 UG/ML
PNEU AB TYPE 54 (15B)*: <0.2 UG/ML
PNEU AB TYPE 56 (18C)*: <0.1 UG/ML
PNEU AB TYPE 57 (19A)*: 0.7 UG/ML
PNEU AB TYPE 68 (9V)*: 0.5 UG/ML
PNEU AB TYPE 70 (33F)*: 0.7 UG/ML
PNEU AB TYPE 8*: <0.3 UG/ML
PNEU AB TYPE 9 (9N)*: 0.5 UG/ML

## 2024-07-16 LAB — IGA SERPL-MCNC: 85.3 MG/DL (ref 40–350)

## 2024-07-17 LAB
C1Q COMPLEMENT: 9 MG/DL
C2 COMPLEMENT: 1.5 MG/DL

## 2024-07-30 ENCOUNTER — OFFICE VISIT (OUTPATIENT)
Dept: SURGERY | Facility: CLINIC | Age: 39
End: 2024-07-30
Payer: COMMERCIAL

## 2024-07-30 DIAGNOSIS — Z90.13 H/O BILATERAL MASTECTOMY: Primary | ICD-10-CM

## 2024-07-30 PROCEDURE — 99024 POSTOP FOLLOW-UP VISIT: CPT | Performed by: SURGERY

## 2024-07-30 NOTE — PROGRESS NOTES
Goldie Martinez is a 39 year old female who presents today in follow-up after undergoing revision breast surgery on 6/19. She denies fever and chills. She denies nausea, vomiting, diarrhea or constipation.       Physical Examination:  Breasts:Bilateral breast incisions are clean dry and intact.  There is a visible Monocryl suture noted at the central aspect of the left breast incision which was debrided.  There is no erythema or seroma noted.  Acceptable shape and symmetry is noted.      Assessment and Plan:  Patient is doing well.  She was instructed to apply topical antibiotic ointment to the left breast incision until completely healed.  She may slowly increase activity with compression in place.  She will follow-up in 6 months for scar check.  The plan was reviewed with the patient and questions were answered.

## 2024-09-18 ENCOUNTER — LAB ENCOUNTER (OUTPATIENT)
Dept: LAB | Age: 39
End: 2024-09-18
Attending: ALLERGY & IMMUNOLOGY
Payer: COMMERCIAL

## 2024-09-18 DIAGNOSIS — M01.X0 ACUTE BACTERIAL ARTHRITIS (HCC): Primary | ICD-10-CM

## 2024-09-18 DIAGNOSIS — A49.9 ACUTE BACTERIAL ARTHRITIS (HCC): Primary | ICD-10-CM

## 2024-09-18 PROCEDURE — 86317 IMMUNOASSAY INFECTIOUS AGENT: CPT

## 2024-09-25 LAB
PNEU AB TYPE 1*: >17.4 UG/ML
PNEU AB TYPE 12 (12F)*: 0.5 UG/ML
PNEU AB TYPE 14*: 1.6 UG/ML
PNEU AB TYPE 17 (17F)*: 1 UG/ML
PNEU AB TYPE 19 (19F)*: >29.4 UG/ML
PNEU AB TYPE 2*: 0.5 UG/ML
PNEU AB TYPE 20*: 1.6 UG/ML
PNEU AB TYPE 22 (22F)*: 16 UG/ML
PNEU AB TYPE 23 (23F)*: 27.8 UG/ML
PNEU AB TYPE 26 (6B)*: >34 UG/ML
PNEU AB TYPE 3*: 1.4 UG/ML
PNEU AB TYPE 34 (10A)*: 7.8 UG/ML
PNEU AB TYPE 4*: 3.8 UG/ML
PNEU AB TYPE 43 (11A)*: 0.9 UG/ML
PNEU AB TYPE 5*: 10.6 UG/ML
PNEU AB TYPE 51 (7F)*: 2.5 UG/ML
PNEU AB TYPE 54 (15B)*: 10.4 UG/ML
PNEU AB TYPE 56 (18C)*: >12.2 UG/ML
PNEU AB TYPE 57 (19A)*: 6.7 UG/ML
PNEU AB TYPE 68 (9V)*: 15 UG/ML
PNEU AB TYPE 70 (33F)*: 4.5 UG/ML
PNEU AB TYPE 8*: 49.6 UG/ML
PNEU AB TYPE 9 (9N)*: 1.5 UG/ML

## 2024-10-28 ENCOUNTER — HOSPITAL ENCOUNTER (OUTPATIENT)
Age: 39
Discharge: HOME | End: 2024-10-28
Payer: COMMERCIAL

## 2024-10-28 ENCOUNTER — TELEPHONE (OUTPATIENT)
Dept: FAMILY MEDICINE CLINIC | Facility: CLINIC | Age: 39
End: 2024-10-28

## 2024-10-28 VITALS
OXYGEN SATURATION: 100 % | RESPIRATION RATE: 19 BRPM | SYSTOLIC BLOOD PRESSURE: 131 MMHG | HEART RATE: 93 BPM | DIASTOLIC BLOOD PRESSURE: 81 MMHG | TEMPERATURE: 98 F

## 2024-10-28 DIAGNOSIS — M25.532 LEFT WRIST PAIN: Primary | ICD-10-CM

## 2024-10-28 PROCEDURE — 99213 OFFICE O/P EST LOW 20 MIN: CPT | Performed by: PHYSICIAN ASSISTANT

## 2024-10-28 PROCEDURE — L3908 WHO COCK-UP NONMOLDE PRE OTS: HCPCS | Performed by: PHYSICIAN ASSISTANT

## 2024-10-28 NOTE — TELEPHONE ENCOUNTER
Patient unable to schedule appointment on 10/30 due to work.     Therefore, patient states she will go to immediate care to be evaluated.

## 2024-10-28 NOTE — TELEPHONE ENCOUNTER
I can see her 10/31 at 11:15 (over-book) or she can see another open provider- will need appt for notes for imaging order if indicated.

## 2024-10-28 NOTE — TELEPHONE ENCOUNTER
Former SD pt, Requesting SDA with Chari.    States weekend of 10/19, was \"lifted off the ground\" and under her blanket was a rock that her hand landed on. Pt has had pain in hand and arm since. States experiencing a tingling feeling in her fingers and up her arm anytime there is pressure to the hand.    States it is sometimes a jabbing/ \"take your breath away pain\".    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    Please advise if/when to schedule?

## 2024-10-28 NOTE — ED INITIAL ASSESSMENT (HPI)
2 wkends ago Pt was sitting leaning back with her hand/palm on a rock.  Pt continues to c/o tingling/numbness to hand.

## 2024-10-28 NOTE — ED PROVIDER NOTES
Patient Seen in: [facility]      History     Chief Complaint   Patient presents with    Hand Pain     Stated Complaint: LEFT HAND PAIN/NUMBNESS    Subjective:   HPI      39-year-old female presents to the IC with left wrist pain for over 1 week.  In the last few days, noted more numbness and tingling sensation to left palm.  Works as a hairdresser.  Symptoms onset after she hyperextended her wrist.    Objective:     Past Medical History:    Anxiety state    Blood disorder    COVID    Loss of smell for 2 days. 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 Rankin - papilloma          Times 1    Oral surgery procedure      [facility]ia Teeth Extraction    Other surgical history  1999    hand surgery x4                Social History     Socioeconomic History    Marital status:    Tobacco Use    Smoking status: Never    Smokeless tobacco: Never   Vaping Use    Vaping status: Never Used   Substance and Sexual Activity    Alcohol use: Yes     Alcohol/week: 2.0 - 4.0 standard drinks of alcohol     Types: 1 - 2 Glasses of wine, 1 - 2 Cans of  beer per week     Comment: has cut down    Drug use: Not Currently     Comment: 1:1 blend of CBD and THC    Sexual activity: Yes     Partners: Male     Birth control/protection: I.U.D.     Comment:    Other Topics Concern    Caffeine Concern Yes     Comment: 1-2 cups daily    Exercise Yes     Comment: 4x weekly    Seat Belt Yes              Review of Systems    Positive for stated complaint: LEFT HAND PAIN/NUMBNESS  Other systems are as noted in HPI.  Constitutional and vital signs reviewed.      All other systems reviewed and negative except as noted above.    Physical Exam     ED Triage Vitals [10/28/24 1019]   /81   Pulse 93   Resp 19   Temp 98.4 °F (36.9 °C)   Temp src Temporal   SpO2 100 %   O2 Device None (Room air)       Current Vitals:   Vital Signs  BP: 131/81  Pulse: 93  Resp: 19  Temp: 98.4 °F (36.9 °C)  Temp src: Temporal    Oxygen Therapy  SpO2: 100 %  O2 Device: None (Room air)        Physical Exam  Vitals and nursing note reviewed.   Constitutional:       Appearance: She is well-developed.   HENT:      Head: Atraumatic.      Right Ear: External ear normal.      Left Ear: External ear normal.   Eyes:      Conjunctiva/sclera: Conjunctivae normal.   Cardiovascular:      Rate and Rhythm: Normal rate.   Pulmonary:      Effort: Pulmonary effort is normal.   Musculoskeletal:      Left wrist: Tenderness (singh, mid) present. No swelling, deformity, bony tenderness or snuff box tenderness. Normal range of motion. Normal pulse.      Cervical back: Normal range of motion and neck supple.      Comments: Negative Phalen's and Tinel's to wrist.   Skin:     General: Skin is warm and dry.      Capillary Refill: Capillary refill takes less than 2 seconds.   Neurological:      Mental Status: She is alert.   Psychiatric:         Mood and Affect: Mood normal.             ED Course   Labs Reviewed - No data to display                MDM      39-year-old female presents to the IC with left wrist pain.   Tenderness elicited with palpation of palmar side of distal left forearm/wrist area.    Differential diagnosis includes but not limited to:  Carpal tunnel syndrome, wrist tendinitis, wrist sprain    No indication of x-ray as patient had no trauma to the area.  Symptoms onset after extension of the wrist.  Patient has no difficulty with flexion of the wrist.  Discussed possibility of tendinitis.  Velcro wrist splint provided.  Recommended use of NSAIDs over-the-counter and follow-up with hand Ortho.  All questions answered.        Medical Decision Making      Disposition and Plan     Clinical Impression:  1. Left wrist pain         Disposition:  Discharge  10/28/2024 10:50 am    Follow-up:  Amanda Hernandez PA  [address] [place] [id number]  [phone]                Medications Prescribed:  Discharge Medication List as of 10/28/2024 10:50 AM              Supplementary Documentation:

## 2024-10-28 NOTE — TELEPHONE ENCOUNTER
Pt went to [facility] as recommended. Was advised needs MRI to determine if possible tendonitis or carpal tunnel. Pt went to [facility] location that did not have MRI machine so was told to f/u with PCP for MRI order.    Pt asking if appointment with Chari is needed or if MRI order can be placed w/o appointment? Please advise?

## 2024-10-29 ENCOUNTER — TELEPHONE (OUTPATIENT)
Dept: FAMILY MEDICINE CLINIC | Facility: CLINIC | Age: 39
End: 2024-10-29

## 2024-10-29 NOTE — TELEPHONE ENCOUNTER
I called the patient and discussed her symptoms with her and follow up. She wants to keep the appointment with Chari on 10/31 to be evaluated and determine what type of test will best diagnose what is causing her symptoms. We did discuss that there is nothing mentioned in the notes suggesting an MRI.  Patient did acknowledge she did see that as well. She will discuss this further with Chari on Thursday.

## 2024-10-31 ENCOUNTER — HOSPITAL ENCOUNTER (OUTPATIENT)
Dept: GENERAL RADIOLOGY | Age: 39
Discharge: HOME | End: 2024-10-31
Attending: NURSE PRACTITIONER
Payer: COMMERCIAL

## 2024-10-31 ENCOUNTER — OFFICE VISIT (OUTPATIENT)
Dept: FAMILY MEDICINE CLINIC | Facility: CLINIC | Age: 39
End: 2024-10-31
Payer: COMMERCIAL

## 2024-10-31 VITALS
DIASTOLIC BLOOD PRESSURE: 58 MMHG | HEART RATE: 72 BPM | TEMPERATURE: 97 F | RESPIRATION RATE: 16 BRPM | BODY MASS INDEX: 25.66 KG/M2 | WEIGHT: 154 LBS | HEIGHT: 65 IN | SYSTOLIC BLOOD PRESSURE: 100 MMHG

## 2024-10-31 DIAGNOSIS — M65.932 TENOSYNOVITIS OF LEFT WRIST: ICD-10-CM

## 2024-10-31 DIAGNOSIS — M65.932 TENOSYNOVITIS OF LEFT WRIST: Primary | ICD-10-CM

## 2024-10-31 DIAGNOSIS — M25.532 LEFT WRIST PAIN: ICD-10-CM

## 2024-10-31 PROCEDURE — 3008F BODY MASS INDEX DOCD: CPT | Performed by: NURSE PRACTITIONER

## 2024-10-31 PROCEDURE — 3078F DIAST BP <80 MM HG: CPT | Performed by: NURSE PRACTITIONER

## 2024-10-31 PROCEDURE — 73110 X-RAY EXAM OF WRIST: CPT | Performed by: NURSE PRACTITIONER

## 2024-10-31 PROCEDURE — 99214 OFFICE O/P EST MOD 30 MIN: CPT | Performed by: NURSE PRACTITIONER

## 2024-10-31 PROCEDURE — 3074F SYST BP LT 130 MM HG: CPT | Performed by: NURSE PRACTITIONER

## 2024-10-31 RX ORDER — METRONIDAZOLE 7.5 MG/G
LOTION TOPICAL
COMMUNITY
Start: 2024-10-14

## 2024-10-31 NOTE — PROGRESS NOTES
Goldie Martinez is a 39 year old female.  HPI:   Reports 10 days ago was sitting on the ground on a blanket, a rock was under the blanket that she was not aware was there, pushed up on rock with her left hand, piercing pain to the area. Hyperextended wrist.   Certain movements make pain worse.   Works as a .  Symptoms have been getting worse. Saw [facility] earlier this week and wearing velcro wrist splint which she does feel help.  Rates pain as a \"8-9/10\" at worst, usually around \"4-5/10\".   OTC: Ibuprofen 600 mg x 1- did feel this helped some.  LMP: 10/11/2024, IUD    Wt Readings from Last 6 Encounters:   10/31/24 154 lb (69.9 kg)   06/19/24 155 lb (70.3 kg)   06/06/24 156 lb 6.4 oz (70.9 kg)   05/23/23 149 lb (67.6 kg)   10/26/22 159 lb 6.4 oz (72.3 kg)   10/18/22 162 lb (73.5 kg)     Current Outpatient Medications   Medication Sig Dispense Refill    metroNIDAZOLE 0.75 % External Lotion Apply to central face once nightly continuously      topiramate 200 MG Oral Tab TAKE 1 TABLET BY MOUTH EVERY DAY 90 tablet 1      Past Medical History:    Anxiety state    Blood disorder    COVID    Loss of smell for 2 days. 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     Comment:    Other Topics Concern    Caffeine Concern Yes     Comment: 1-2 cups daily    Exercise Yes     Comment: 4x weekly    Seat Belt Yes        REVIEW OF SYSTEMS:   GENERAL HEALTH: feels well otherwise  RESPIRATORY: denies shortness of breath with exertion  CARDIOVASCULAR: denies chest pain on exertion  GI: denies abdominal pain and denies heartburn  NEURO: denies headaches  Musculoskeletal: SEE HPI  EXAM:   /58   Pulse 72   Temp 97 °F (36.1 °C) (Temporal)   Resp 16   Ht 5' 5\" (1.651 m)   Wt 154 lb (69.9 kg)   LMP 10/11/2024 (Approximate)   BMI 25.63 kg/m²   GENERAL: well developed, well nourished,in no apparent distress  EYES: sclera clear without injection  NECK: supple,no adenopathy  LUNGS: clear to auscultation no rales, rhonchi or wheezes  CARDIO: RRR without murmur  EXTREMITIES: no cyanosis, clubbing or edema +2 radial pulses  Musculoskeletal: No discomfort with palpation over bilateral shoulders, down bilateral arms or over bilateral wrists. There is mild discomfort reported with flexion of the left wrist. + finkelstein's on left. Negative Tinel's. Equal hand  and strength.  Neurological: nerves II through XII grossly intact no sensorimotor deficit  Psychological: Mood and affect are normal.  Good communication skills.  ASSESSMENT AND PLAN:     Encounter Diagnoses   Name Primary?    Tenosynovitis of left wrist Yes    Left wrist pain        1. Tenosynovitis of left wrist  - XR WRIST COMPLETE (MIN 3 VIEWS), LEFT (CPT=73110); Future    2. Left wrist pain  - XR WRIST COMPLETE (MIN 3 VIEWS), LEFT (CPT=73110); Future       No orders of the defined types were placed in this encounter.      Meds & Refills for this Visit:  Requested Prescriptions      No prescriptions  requested or ordered in this encounter       Imaging & Consults:  None    Follow Up with:  No follow-up provider specified.      XR as ordered.  Can continue wearing brace.  Ibuprofen 600 mg BID (with food) x 7 days.  Await XR results for further recommendations.      The patient indicates understanding of these issues and agrees to the plan.  The patient is asked to return pending above.

## 2024-11-05 DIAGNOSIS — G43.009 MIGRAINE WITHOUT AURA AND WITHOUT STATUS MIGRAINOSUS, NOT INTRACTABLE: ICD-10-CM

## 2024-11-05 RX ORDER — TOPIRAMATE 200 MG/1
TABLET, FILM COATED ORAL
Qty: 90 TABLET | Refills: 1 | Status: CANCELLED | OUTPATIENT
Start: 2024-11-05

## 2024-11-05 NOTE — TELEPHONE ENCOUNTER
Last Office Visit: 10/31/2024 for acute   Last Refill:   Medication Quantity Refills Start End   topiramate 200 MG Oral Tab 90 tablet 1 10/12/2023 --     Return to Clinic: n/a    Protocol: n/a

## 2024-11-07 NOTE — TELEPHONE ENCOUNTER
Spoke with patient and informed her of Chari's comments. Patient asked what to do if she needs a refill before her appointment. I asked patient to schedule medication check before she is due for a refill so that it may be sent during that visit. Patient agreed to plan and will call us if refill is not available as it says on her bottle.

## 2024-11-07 NOTE — TELEPHONE ENCOUNTER
If she has a refill on her current rx- I will see her in January. Unsure exception under unusual circumstances (?)

## 2024-11-25 ENCOUNTER — TELEPHONE (OUTPATIENT)
Dept: FAMILY MEDICINE CLINIC | Facility: CLINIC | Age: 39
End: 2024-11-25

## 2024-11-25 DIAGNOSIS — G43.009 MIGRAINE WITHOUT AURA AND WITHOUT STATUS MIGRAINOSUS, NOT INTRACTABLE: ICD-10-CM

## 2024-11-25 RX ORDER — TOPIRAMATE 200 MG/1
TABLET, FILM COATED ORAL
Qty: 30 TABLET | Refills: 0 | OUTPATIENT
Start: 2024-11-25

## 2024-11-25 NOTE — TELEPHONE ENCOUNTER
Patient is requesting an appointment for med check for refills. Needs refill of topiramate 200 MG Oral Tab

## 2024-11-25 NOTE — TELEPHONE ENCOUNTER
Did not pass protocol    Last office visit 5/23/23    Last refill was:           topiramate 200 MG Oral Tab 90 tablet 1 10/12/2023 --    Sig: TAKE 1 TABLET BY MOUTH EVERY DAY    Sent to pharmacy as: Topiramate 200 MG Oral Tablet (TopaMAX)          Next appointment: none    Please sign pended medication if appropriate

## 2024-11-25 NOTE — TELEPHONE ENCOUNTER
Medication check scheduled 12/3.    PAST MEDICAL HISTORY:  Dilated aortic root     Hemorrhage of brain, nontraumatic     Lymphoma large b cell    Personal history of extracorporeal membrane oxygenation (ECMO)     Pressure ulcer     Pulmonary embolus     S/P compartment syndrome decompression

## 2024-11-25 NOTE — TELEPHONE ENCOUNTER
Patient requesting refill for     topiramate 200 MG Oral Tab     [facility]/pharmacy #[id number] - [place], [place] - [address] [phone], [phone]

## 2024-12-03 ENCOUNTER — OFFICE VISIT (OUTPATIENT)
Dept: FAMILY MEDICINE CLINIC | Facility: CLINIC | Age: 39
End: 2024-12-03
Payer: COMMERCIAL

## 2024-12-03 VITALS
HEART RATE: 80 BPM | DIASTOLIC BLOOD PRESSURE: 60 MMHG | HEIGHT: 65 IN | TEMPERATURE: 98 F | RESPIRATION RATE: 18 BRPM | WEIGHT: 153 LBS | BODY MASS INDEX: 25.49 KG/M2 | SYSTOLIC BLOOD PRESSURE: 102 MMHG

## 2024-12-03 DIAGNOSIS — G43.009 MIGRAINE WITHOUT AURA AND WITHOUT STATUS MIGRAINOSUS, NOT INTRACTABLE: ICD-10-CM

## 2024-12-03 DIAGNOSIS — B96.89 BACTERIAL URI: Primary | ICD-10-CM

## 2024-12-03 DIAGNOSIS — J06.9 BACTERIAL URI: Primary | ICD-10-CM

## 2024-12-03 PROCEDURE — 3078F DIAST BP <80 MM HG: CPT | Performed by: FAMILY MEDICINE

## 2024-12-03 PROCEDURE — 3074F SYST BP LT 130 MM HG: CPT | Performed by: FAMILY MEDICINE

## 2024-12-03 PROCEDURE — G2211 COMPLEX E/M VISIT ADD ON: HCPCS | Performed by: FAMILY MEDICINE

## 2024-12-03 PROCEDURE — 3008F BODY MASS INDEX DOCD: CPT | Performed by: FAMILY MEDICINE

## 2024-12-03 PROCEDURE — 99214 OFFICE O/P EST MOD 30 MIN: CPT | Performed by: FAMILY MEDICINE

## 2024-12-03 RX ORDER — TOPIRAMATE 200 MG/1
TABLET, FILM COATED ORAL
Qty: 90 TABLET | Refills: 0 | Status: SHIPPED | OUTPATIENT
Start: 2024-12-03

## 2024-12-03 RX ORDER — AMOXICILLIN 500 MG/1
500 CAPSULE ORAL 2 TIMES DAILY
Qty: 14 CAPSULE | Refills: 0 | Status: SHIPPED | OUTPATIENT
Start: 2024-12-03 | End: 2024-12-10

## 2024-12-03 NOTE — PROGRESS NOTES
[facility] Family [facility] Office Note  Chief Complaint:   Chief Complaint   Patient presents with    Medication Follow-Up    Migraine    Sinus Problem     Sinus pressure        HPI:   This is a 39 year old female coming in for medication follow-up.  The patient has been on Topamax to help with migraine headaches.  She states that she has benefited from the medication she has not had any increase in her migraines.  She also states that she has had a continued sinus pressure congestion that has been going on for last 10 days she also has had a cough denies any fever or shortness of breath.  She does have a history of migraines      Past Medical History:    Anxiety state    Blood disorder    COVID    Loss of smell for 2 days. 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 Rankin - mayo          Times 1    Oral surgery procedure      [facility] Teeth Extraction    Other surgical history  1999    hand surgery x4     Social History:  Social History     Socioeconomic History    Marital status:    Tobacco Use    Smoking status: Never    Smokeless tobacco:  Never   Vaping Use    Vaping status: Never Used   Substance and Sexual Activity    Alcohol use: Yes     Alcohol/week: 2.0 - 4.0 standard drinks of alcohol     Types: 1 - 2 Glasses of wine, 1 - 2 Cans of beer per week     Comment: has cut down    Drug use: Not Currently     Comment: 1:1 blend of CBD and THC    Sexual activity: Yes     Partners: Male     Birth control/protection: I.U.D.     Comment:    Other Topics Concern    Caffeine Concern Yes     Comment: 1-2 cups daily    Exercise Yes     Comment: 4x weekly    Seat Belt Yes     Family History:  Family History   Problem Relation Age of Onset    Ovarian Cancer Paternal Grandmother     Fibromyalgia Mother     Other (chronic lyme disease) Mother     Depression Sister     Renal Disease Sister     Diabetes Father         type 2    Other (Other) Father         Benign breast mass, removed in his 20's    Breast Cancer Paternal Aunt 48        Recurrence    Breast Cancer Paternal Aunt         in her 50's    Anxiety Son     Breast Cancer Paternal Cousin Female          premenopausal    Breast Cancer Paternal Aunt         Great Aunts x 2    Breast Cancer Other         Father's female cousin    Breast Cancer Maternal Aunt         Great aunt    Breast Cancer Maternal Cousin Female         Pre menopausal    Breast Cancer Paternal Cousin Female         Pre menopausal     Allergies:  Allergies[1]  Current Meds:  Current Outpatient Medications   Medication Sig Dispense Refill    topiramate 200 MG Oral Tab TAKE 1 TABLET BY MOUTH EVERY DAY 90 tablet 0    amoxicillin 500 MG Oral Cap Take 1 capsule (500 mg total) by mouth 2 (two) times daily for 7 days. 14 capsule 0    metroNIDAZOLE 0.75 % External Lotion Apply to central face once nightly continuously        Counseling given: Not Answered       REVIEW OF SYSTEMS:   Consitutional: No fevers, chills, sweats  Eye: No recent visual problems  ENMT: No ear pain positive nasal congestion sore throat  Respiratory: No shortness of  breath, positive cough  Cardiovascular: No chest pain palpitations syncope  Gastrointestinal: No nausea vomiting diarrhea  Genitourinary: No hematuria  Hema/Lymph no bruising tendency, swollen lymph glands  Neurologic: Alert and oriented x4  Psychiatric: No anxiety, depression    Medical, surgical, family, and social histories were reviewed      EXAM:   VITALS:   Vitals:    12/03/24 0826   BP: 102/60   Pulse: 80   Resp: 18   Temp: 97.8 °F (36.6 °C)      GENERAL: well developed, well nourished, in no apparent distress  SKIN: no rashes, no suspicious lesions: Cool and Dry  HEENT: atraumatic, normocephalic, ears and throat are clear there is ethmoid sinus pressure to palpation  Ears: TM's clear and visible bilaterally, no excess cerumen or erythema.   EYES: Pupils equal round and reactive.  Extraocular motions intact no scleral icterus no injection or drainage  THROAT with erythema no tonsillar hypertrophy or exudate.  Uvula midline airway patent  NECK: Given midline.  No JVD or lymphadenopathy supple nontender no meningeal signs   LUNGS: clear to auscultation sounds equal bilaterally no wheezes rales or rhonchi  CARDIO: Regular rate and rhythm without murmurs gallops or rubs    NEURO: Awake and alert.  Cranial nerves II through XII intact motor and sensory grossly within normal limits.  5 out of 5 muscle strength in all muscle groups.  Normal speech.         ASSESSMENT AND PLAN:   1. Migraine without aura and without status migrainosus, not intractable  - topiramate 200 MG Oral Tab; TAKE 1 TABLET BY MOUTH EVERY DAY  Dispense: 90 tablet; Refill: 0  I did discuss with the patient at this time we will continue with the Topamax she has been benefiting from the medication she denies any concerns with the side effect profile of the medication.  She was asked to continue to take it as prescribed and follow-up in the next 6 months  2. Bacterial URI  - amoxicillin 500 MG Oral Cap; Take 1 capsule (500 mg total) by mouth 2 (two)  times daily for 7 days.  Dispense: 14 capsule; Refill: 0  Did discuss with the patient we will go ahead and start her on amoxicillin she was asked to use this twice a day for the next 7 days she was asked to use Flonase twice a day as well as Benadryl.    Meds & Refills for this Visit:  Requested Prescriptions     Signed Prescriptions Disp Refills    topiramate 200 MG Oral Tab 90 tablet 0     Sig: TAKE 1 TABLET BY MOUTH EVERY DAY    amoxicillin 500 MG Oral Cap 14 capsule 0     Sig: Take 1 capsule (500 mg total) by mouth 2 (two) times daily for 7 days.       Health Maintenance:  Health Maintenance Due   Topic Date Due    Annual Depression Screening  01/01/2024    Annual Physical  05/23/2024    COVID-19 Vaccine (2 - 2024-25 season) 09/01/2024    Influenza Vaccine (1) Never done       Patient/Caregiver Education: Patient/Caregiver Education: There are no barriers to learning. Medical education done.   Outcome: Patient verbalizes understanding. Patient is notified to call with any questions, complications, allergies, or worsening or changing symptoms.  Patient is to call with any side effects or complications from the treatments as a result of today.     Problem List:  Patient Active Problem List   Diagnosis    Unspecified vitamin D deficiency    Migraine    Anxiety    Absence of breast, bilateral    H/O bilateral mastectomy         No follow-ups on file.     Jarrod Vaughn MD    Please note that portions of this note may have been completed with a voice recognition program. Efforts were made to edit the dictations but occasionally words are mis-transcribed.       [1]   Allergies  Allergen Reactions    Other ITCHING     Steri strip  Patient reports after mastectomy steri-strips caused blisters.

## 2024-12-09 ENCOUNTER — PATIENT MESSAGE (OUTPATIENT)
Dept: FAMILY MEDICINE CLINIC | Facility: CLINIC | Age: 39
End: 2024-12-09

## 2024-12-10 RX ORDER — BENZONATATE 200 MG/1
200 CAPSULE ORAL 3 TIMES DAILY PRN
Qty: 40 CAPSULE | Refills: 0 | Status: SHIPPED | OUTPATIENT
Start: 2024-12-10 | End: 2024-12-11

## 2024-12-10 NOTE — TELEPHONE ENCOUNTER
Have the patient continue with Flonase as well as Benadryl I did send in Augmentin for the next 7 days as well as benzonatate pills with the cough this should alleviate her symptoms

## 2024-12-11 RX ORDER — BENZONATATE 200 MG/1
200 CAPSULE ORAL 3 TIMES DAILY PRN
Qty: 40 CAPSULE | Refills: 0 | Status: SHIPPED | OUTPATIENT
Start: 2024-12-11

## 2024-12-11 NOTE — TELEPHONE ENCOUNTER
Informed patient of recommendations and prescriptions that were sent to the pharmacy.     Prescriptions were originally sent to [facility] Mail order. Resent to local pharmacy.     Patient agrees to plan and verbalized an understanding.

## 2024-12-26 ENCOUNTER — MED REC SCAN ONLY (OUTPATIENT)
Dept: FAMILY MEDICINE CLINIC | Facility: CLINIC | Age: 39
End: 2024-12-26

## 2025-02-07 ENCOUNTER — OFFICE VISIT (OUTPATIENT)
Dept: SURGERY | Facility: CLINIC | Age: 40
End: 2025-02-07
Payer: COMMERCIAL

## 2025-02-07 DIAGNOSIS — Z90.13 H/O BILATERAL MASTECTOMY: Primary | ICD-10-CM

## 2025-02-07 PROCEDURE — 99212 OFFICE O/P EST SF 10 MIN: CPT | Performed by: SURGERY

## 2025-02-07 NOTE — PROGRESS NOTES
Goldie Martinez is a 39 year old female who presents today in follow-up.   She underwent placement of style  cc implants in June 2024.  She is without new complaints.  She denies any breast masses, skin changes, or nipple discharge.    Physical Examination:  HEENT: There is no cervical or supraclavicular lymphadenopathy noted.    Breasts: Bilateral breasts are soft without palpable masses.  Good shape and symmetry is noted.  There is no nipple inversion or nipple discharge noted.  There is no axillary lymphadenopathy noted bilaterally.  There is no erythema or seroma noted.    Assessment and Plan:  Patient is doing well.  We reviewed the recommended FDA surveillance protocol for silicone implants.  We discussed continuing regular self breast examination with a plan for follow-up in 1 year or sooner if any changes are detected.  The plan was reviewed with the patient and questions were answered.

## 2025-02-28 DIAGNOSIS — G43.009 MIGRAINE WITHOUT AURA AND WITHOUT STATUS MIGRAINOSUS, NOT INTRACTABLE: ICD-10-CM

## 2025-02-28 RX ORDER — TOPIRAMATE 200 MG/1
TABLET, FILM COATED ORAL
Qty: 90 TABLET | Refills: 0 | Status: SHIPPED | OUTPATIENT
Start: 2025-02-28

## 2025-02-28 NOTE — TELEPHONE ENCOUNTER
Last Office Visit: 12/03/2024    Last Refill:   Medication Quantity Refills Start End   topiramate 200 MG Oral Tab 90 tablet 0 12/3/2024 --     Return to Clinic: n/a    Protocol: passed

## 2025-03-03 ENCOUNTER — HOSPITAL ENCOUNTER (OUTPATIENT)
Age: 40
Discharge: HOME | End: 2025-03-03
Payer: COMMERCIAL

## 2025-03-03 VITALS
TEMPERATURE: 99 F | RESPIRATION RATE: 18 BRPM | SYSTOLIC BLOOD PRESSURE: 118 MMHG | HEART RATE: 85 BPM | DIASTOLIC BLOOD PRESSURE: 80 MMHG | OXYGEN SATURATION: 99 %

## 2025-03-03 DIAGNOSIS — R05.9 COUGH: ICD-10-CM

## 2025-03-03 DIAGNOSIS — J10.1 INFLUENZA A: ICD-10-CM

## 2025-03-03 DIAGNOSIS — H66.001 NON-RECURRENT ACUTE SUPPURATIVE OTITIS MEDIA OF RIGHT EAR WITHOUT SPONTANEOUS RUPTURE OF TYMPANIC MEMBRANE: Primary | ICD-10-CM

## 2025-03-03 LAB
POCT INFLUENZA A: POSITIVE
POCT INFLUENZA B: NEGATIVE
S PYO AG THROAT QL: NEGATIVE
SARS-COV-2 RNA RESP QL NAA+PROBE: NOT DETECTED

## 2025-03-03 PROCEDURE — 99214 OFFICE O/P EST MOD 30 MIN: CPT | Performed by: NURSE PRACTITIONER

## 2025-03-03 PROCEDURE — 87502 INFLUENZA DNA AMP PROBE: CPT | Performed by: NURSE PRACTITIONER

## 2025-03-03 PROCEDURE — 87880 STREP A ASSAY W/OPTIC: CPT | Performed by: NURSE PRACTITIONER

## 2025-03-03 PROCEDURE — U0002 COVID-19 LAB TEST NON-CDC: HCPCS | Performed by: NURSE PRACTITIONER

## 2025-03-03 NOTE — ED PROVIDER NOTES
Patient Seen in: [facility]      History     Chief Complaint   Patient presents with    Cough     Fever on and off, body aches, ear pain in both ears, chest pressure, runny nose, back pain (assuming from coughing so vigorously). - Entered by patient     Stated Complaint: Cough - Fever on and off, body aches, ear pain in both ears, chest pressure, ru*    Subjective: This is a 40-year-old female, no significant past medical history, presents to immediate care clinic with viral complaints of been ongoing for the past 6 days.  Patient reports fever, chills, body aches, cough, congestion, runny nose.  She is lower back pain from \"coughing so hard\".  No chest pain, dizziness, lightheadedness, palpitations, shortness of breath.  Patient states new onset bilateral ear pain over the past 48 hours.  She spiked a low-grade fever of 100.3 last night.  Afebrile on arrival.  Denies any recent travel or sick contacts.  No recent swimming.  Ill-appearing but nontoxic.  AOx4.  The history is provided by the patient.             Objective:     Past Medical History:    Anxiety state    Blood disorder    COVID    Loss of smell for 2 days. 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 Rankin - papilloma          Times 1    Oral surgery procedure  2003    [place] Teeth Extraction    Other surgical history  1999    hand surgery x4                Social History     Socioeconomic History    Marital status:    Tobacco Use    Smoking status: Never    Smokeless tobacco: Never   Vaping Use    Vaping status: Never Used   Substance and Sexual Activity    Alcohol use: Yes     Alcohol/week: 2.0 - 4.0 standard drinks of alcohol     Types: 1 - 2 Glasses of wine, 1 - 2 Cans of beer per week     Comment: has cut down    Drug use: Not Currently     Comment: 1:1 blend of CBD and THC    Sexual activity: Yes     Partners: Male     Birth control/protection: I.U.D.     Comment:    Other Topics Concern    Caffeine Concern Yes     Comment: 1-2 cups daily    Exercise Yes     Comment: 4x weekly    Seat Belt Yes              Review of Systems   Constitutional:  Positive for activity change, appetite change, chills, fatigue and fever.   HENT:  Positive for congestion, ear pain, postnasal drip, rhinorrhea, sinus pressure, sinus pain, sneezing and sore throat. Negative for ear discharge.    Eyes: Negative.    Respiratory:  Positive for cough. Negative for chest tightness, shortness of breath, wheezing and stridor.    Cardiovascular: Negative.    Gastrointestinal: Negative.    Musculoskeletal:  Positive for arthralgias, back pain and myalgias. Negative for neck pain and neck stiffness.   Skin: Negative.    Neurological:  Positive for headaches. Negative for dizziness, weakness and light-headedness.       Positive for stated complaint: Cough - Fever on and off, body aches, ear pain in both ears, chest pressure, ru*  Other systems are as noted in HPI.  Constitutional and vital signs reviewed.      All other systems reviewed and negative except as noted above.    Physical Exam     ED  Triage Vitals [03/03/25 1019]   /80   Pulse 85   Resp 18   Temp 98.7 °F (37.1 °C)   Temp src Oral   SpO2 99 %   O2 Device None (Room air)       Current Vitals:   Vital Signs  BP: 118/80  Pulse: 85  Resp: 18  Temp: 98.7 °F (37.1 °C)  Temp src: Oral    Oxygen Therapy  SpO2: 99 %  O2 Device: None (Room air)        Physical Exam  Constitutional:       General: She is not in acute distress.     Appearance: Normal appearance. She is ill-appearing. She is not toxic-appearing.   HENT:      Head: Normocephalic.      Jaw: There is normal jaw occlusion.      Right Ear: Tenderness present. No drainage or swelling. Tympanic membrane is erythematous and bulging. Tympanic membrane is not retracted.      Left Ear: Tenderness present. No drainage or swelling. There is impacted cerumen.      Nose: Congestion and rhinorrhea present.      Right Sinus: No maxillary sinus tenderness or frontal sinus tenderness.      Left Sinus: No maxillary sinus tenderness or frontal sinus tenderness.      Mouth/Throat:      Lips: Pink. No lesions.      Mouth: Mucous membranes are moist. No oral lesions.      Dentition: No gum lesions.      Tongue: No lesions.      Palate: No lesions.      Pharynx: Uvula midline. Posterior oropharyngeal erythema and postnasal drip present. No pharyngeal swelling, oropharyngeal exudate or uvula swelling.   Eyes:      General:         Right eye: No discharge.         Left eye: No discharge.      Extraocular Movements: Extraocular movements intact.      Pupils: Pupils are equal, round, and reactive to light.   Cardiovascular:      Rate and Rhythm: Normal rate and regular rhythm.      Pulses: Normal pulses.      Heart sounds: Normal heart sounds.   Pulmonary:      Effort: Pulmonary effort is normal. No respiratory distress.      Breath sounds: Normal breath sounds. No stridor. No wheezing, rhonchi or rales.   Chest:      Chest wall: No tenderness.   Musculoskeletal:         General: Normal range of motion.       Cervical back: Normal range of motion.   Lymphadenopathy:      Cervical: No cervical adenopathy.   Skin:     General: Skin is warm.      Capillary Refill: Capillary refill takes less than 2 seconds.   Neurological:      General: No focal deficit present.      Mental Status: She is alert and oriented to person, place, and time.             ED Course     Labs Reviewed   POCT FLU TEST - Abnormal; Notable for the following components:       Result Value    POCT INFLUENZA A Positive (*)     All other components within normal limits    Narrative:     This assay is a rapid molecular in vitro test utilizing nucleic acid amplification of influenza A and B viral RNA.   POCT RAPID STREP - Normal   RAPID SARS-COV-2 BY PCR - Normal                   MDM      Differentials considered include: COVID-19, influenza A, influenza B, strep pharyngitis, viral URI, pneumonia, bronchitis, AOM.    Patient has been sick since Wednesday, x 6 days.  Positive fever Tmax 100.9, headache, body aches, chills, cough, congestion, runny nose, sore throat.  Patient notes recent onset of bilateral ear pain.    Low-grade fever yesterday of 100.3.  Arrives afebrile.    Lungs are clear on examination.  No wheezing, rhonchi, coarse or diminished breath sounds.  No evidence of pneumonia or bronchitis.    PERC negative - non smoker, not on oral contraceptives, no recent travel. No recent travel or prolonged immobilization.     Patient is negative for COVID-19.    Right TM erythematous and bulging.  Left TM unable to be visualized due to cerumen impaction.  No coexisting effusion of right TM.  Positive right AOM.  Antibiotics of Augmentin prescribed.    Posterior pharynx with positive postnasal drip.  Mild erythema.  No edema or exudate.  Uvula midline and normal in size.  Mucous membranes moist.  No intraoral lesions or petechiae.  No cervical lymphadenopathy.  Patient is tolerating her own secretions well without difficulty.  No excessive drooling,  stridor, voice change.  No evidence of peritonsillar abscess.  --> Patient is negative for strep throat.    She is positive for influenza A, however, she has been symptomatic for 6 days.  She is out of therapeutic treatment window with Tamiflu.  However, because patient is still spiking fevers and demonstrates a right ear infection, will treat for bacterial ear infection versus viral ear infection.    Patient is aware of Augmentin sent to the pharmacy.  She is aware to start today.  Twice a day for 10 days.  Take with food and water.  Drink plenty water and get plenty of rest.  Next  Patient is aware of additional supportive and symptomatic management at home.    Encourage patient that if she does not feel better in 5 to 7 days from an influenza standpoint to follow-up with her primary care doctor.    She is aware to go to ER if she has any chest pain, dizziness, lightheadedness, palpitations, shortness of breath.          Medical Decision Making      Disposition and Plan     Clinical Impression:  1. Non-recurrent acute suppurative otitis media of right ear without spontaneous rupture of tympanic membrane    2. Cough    3. Influenza A         Disposition:  Discharge  3/3/2025 11:02 am    Follow-up:  Jarrod Vaughn MD  [address] [place] [id number]  [phone]    In 7 days  If symptoms worsen          Medications Prescribed:  Discharge Medication List as of 3/3/2025 11:07 AM              Supplementary Documentation:

## 2025-03-03 NOTE — DISCHARGE INSTRUCTIONS
You are negative for COVID-19 and strep throat.  You are positive for influenza A, however, because you have been sick for 6 days, you are out of therapeutic treatment window with Tamiflu.  Your right ear is infected, antibiotic sent to the pharmacy.  Start today.  Take twice a day for 10 days.  Take with food and water.  Avoid getting water in ear: No tub baths or swimming.    Tylenol every 4 hours Motrin for 6 hours as needed for fever and pain alleviation.    Sleep somewhat elevated upright.  Sleep with humidifier.  Steam showers for cough and congestion.  2 teaspoons of honey at bedtime to help suppress the cough.    We have been seeing the flu last about 7 to 10 days.  I would say that if you do not feel better after 5-7 days from now, see your primary care doctor.    If you have any chest pain, dizziness, lightheadedness, palpitations, shortness of breath, please go to ER.

## 2025-04-14 ENCOUNTER — LAB ENCOUNTER (OUTPATIENT)
Dept: LAB | Age: 40
End: 2025-04-14
Attending: NURSE PRACTITIONER
Payer: COMMERCIAL

## 2025-04-14 DIAGNOSIS — D80.3 SELECTIVE DEFICIENCY OF IGG (HCC): Primary | ICD-10-CM

## 2025-04-14 LAB — IMMUNOGLOBULIN PNL SER-MCNC: 540 MG/DL (ref 650–1600)

## 2025-04-14 PROCEDURE — 36415 COLL VENOUS BLD VENIPUNCTURE: CPT

## 2025-04-14 PROCEDURE — 82784 ASSAY IGA/IGD/IGG/IGM EACH: CPT

## 2025-05-06 ENCOUNTER — TELEPHONE (OUTPATIENT)
Dept: FAMILY MEDICINE CLINIC | Facility: CLINIC | Age: 40
End: 2025-05-06

## 2025-05-06 ENCOUNTER — HOSPITAL ENCOUNTER (EMERGENCY)
Facility: HOSPITAL | Age: 40
Discharge: HOME | End: 2025-05-06
Attending: EMERGENCY MEDICINE
Payer: COMMERCIAL

## 2025-05-06 VITALS
OXYGEN SATURATION: 100 % | RESPIRATION RATE: 17 BRPM | TEMPERATURE: 99 F | DIASTOLIC BLOOD PRESSURE: 72 MMHG | SYSTOLIC BLOOD PRESSURE: 122 MMHG | BODY MASS INDEX: 24 KG/M2 | WEIGHT: 145 LBS | HEART RATE: 76 BPM

## 2025-05-06 DIAGNOSIS — K92.2 GASTROINTESTINAL HEMORRHAGE, UNSPECIFIED GASTROINTESTINAL HEMORRHAGE TYPE: Primary | ICD-10-CM

## 2025-05-06 LAB
ALBUMIN SERPL-MCNC: 4.9 G/DL (ref 3.2–4.8)
ALBUMIN/GLOB SERPL: 2.5 {RATIO} (ref 1–2)
ALP LIVER SERPL-CCNC: 41 U/L (ref 37–98)
ALT SERPL-CCNC: 11 U/L (ref 10–49)
ANION GAP SERPL CALC-SCNC: 11 MMOL/L (ref 0–18)
AST SERPL-CCNC: 14 U/L (ref ?–34)
BASOPHILS # BLD AUTO: 0.02 X10(3) UL (ref 0–0.2)
BASOPHILS NFR BLD AUTO: 0.4 %
BILIRUB SERPL-MCNC: 0.5 MG/DL (ref 0.3–1.2)
BUN BLD-MCNC: 11 MG/DL (ref 9–23)
CALCIUM BLD-MCNC: 9.6 MG/DL (ref 8.7–10.6)
CHLORIDE SERPL-SCNC: 109 MMOL/L (ref 98–112)
CO2 SERPL-SCNC: 21 MMOL/L (ref 21–32)
CREAT BLD-MCNC: 0.93 MG/DL (ref 0.55–1.02)
EGFRCR SERPLBLD CKD-EPI 2021: 80 ML/MIN/1.73M2 (ref 60–?)
EOSINOPHIL # BLD AUTO: 0.06 X10(3) UL (ref 0–0.7)
EOSINOPHIL NFR BLD AUTO: 1.2 %
ERYTHROCYTE [DISTWIDTH] IN BLOOD BY AUTOMATED COUNT: 12.4 %
GLOBULIN PLAS-MCNC: 2 G/DL (ref 2–3.5)
GLUCOSE BLD-MCNC: 94 MG/DL (ref 70–99)
HCT VFR BLD AUTO: 41.5 % (ref 35–48)
HGB BLD-MCNC: 15.1 G/DL (ref 12–16)
IMM GRANULOCYTES # BLD AUTO: 0.02 X10(3) UL (ref 0–1)
IMM GRANULOCYTES NFR BLD: 0.4 %
LIPASE SERPL-CCNC: 37 U/L (ref 12–53)
LYMPHOCYTES # BLD AUTO: 1.78 X10(3) UL (ref 1–4)
LYMPHOCYTES NFR BLD AUTO: 34.2 %
MCH RBC QN AUTO: 32 PG (ref 26–34)
MCHC RBC AUTO-ENTMCNC: 36.4 G/DL (ref 31–37)
MCV RBC AUTO: 87.9 FL (ref 80–100)
MONOCYTES # BLD AUTO: 0.27 X10(3) UL (ref 0.1–1)
MONOCYTES NFR BLD AUTO: 5.2 %
NEUTROPHILS # BLD AUTO: 3.06 X10 (3) UL (ref 1.5–7.7)
NEUTROPHILS # BLD AUTO: 3.06 X10(3) UL (ref 1.5–7.7)
NEUTROPHILS NFR BLD AUTO: 58.6 %
OSMOLALITY SERPL CALC.SUM OF ELEC: 291 MOSM/KG (ref 275–295)
PLATELET # BLD AUTO: 221 10(3)UL (ref 150–450)
POTASSIUM SERPL-SCNC: 3.8 MMOL/L (ref 3.5–5.1)
PROT SERPL-MCNC: 6.9 G/DL (ref 5.7–8.2)
RBC # BLD AUTO: 4.72 X10(6)UL (ref 3.8–5.3)
SODIUM SERPL-SCNC: 141 MMOL/L (ref 136–145)
WBC # BLD AUTO: 5.2 X10(3) UL (ref 4–11)

## 2025-05-06 PROCEDURE — 80053 COMPREHEN METABOLIC PANEL: CPT

## 2025-05-06 PROCEDURE — 80053 COMPREHEN METABOLIC PANEL: CPT | Performed by: EMERGENCY MEDICINE

## 2025-05-06 PROCEDURE — 99283 EMERGENCY DEPT VISIT LOW MDM: CPT

## 2025-05-06 PROCEDURE — 83690 ASSAY OF LIPASE: CPT

## 2025-05-06 PROCEDURE — 85025 COMPLETE CBC W/AUTO DIFF WBC: CPT

## 2025-05-06 PROCEDURE — 85025 COMPLETE CBC W/AUTO DIFF WBC: CPT | Performed by: EMERGENCY MEDICINE

## 2025-05-06 PROCEDURE — 83690 ASSAY OF LIPASE: CPT | Performed by: EMERGENCY MEDICINE

## 2025-05-06 PROCEDURE — 36415 COLL VENOUS BLD VENIPUNCTURE: CPT

## 2025-05-06 PROCEDURE — 99284 EMERGENCY DEPT VISIT MOD MDM: CPT

## 2025-05-06 NOTE — TELEPHONE ENCOUNTER
Called patient regarding the appointment she made on 5/8/25.     Starting last week, she noticed a large amount of blood in the toilet. She thought it was strictly because she was on her menstrual cycle at that time. She is also overdue for her IUD to be replaced.     However, the patient is now done with her menstrual cycle, and the toilet water is very bright red when she has a bowel movement. Has urges to have a bowel movement often. Her stool is a liquid consistency, almost like diarrhea, with some stringy consistency. It is difficult to determine if the blood is in the stool. She has a bowel movement every morning, sometimes now in the evening. The bowel movements occur more frequently after she consumes food. She is still tolerating food and liquids well. No nausea. No vomiting. No abdominal pain. No fever. Otherwise asymptomatic. She is not straining to have a bowel movement. She feels slightly more tired the past 3 days, but has been staying up late so thought this is why she felt fatigued.     Given the amount and ongoing blood occuring, patient was advised to go to the ER at this time. Patient agrees to plan and verbalized an understanding.

## 2025-05-06 NOTE — TELEPHONE ENCOUNTER
[facility] Triage      Appointment for: Goldie Martinez ([id number])   Visit type: MYCHART EXAM (2964)   5/8/2025 8:30 AM (15 minutes) with Jarrod Vaughn in [facility] [address] [place]      Patient comments:   Having issues with my bowels… think there is blood in them.

## 2025-05-06 NOTE — ED INITIAL ASSESSMENT (HPI)
Pt sent by PCP d/t blood in stool. Pt describes it bright red in color and watery stool, occurring for ten days. Denies pain. Reports low Igg levels.

## 2025-05-07 NOTE — ED PROVIDER NOTES
Patient Seen in: [facility] Emergency Department      History     Chief Complaint   Patient presents with    GI Bleeding     Stated Complaint: rectal bleeding x one week    Subjective:     HPI    40-year-old woman with anxiety, hemiplegic migraines, and seizure disorder (topiramate) who reports noticing blood in her stool, initially thought to be related to her menstrual period, but the bleeding persisted after her period ended. She describes the stool as very soft, resembling peanut butter, with the toilet water appearing very red. She has not experienced any pain or vomiting.     Objective:   Past Medical History:    Anxiety state    Blood disorder    COVID    Loss of smell for 2 days. 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 Rankin - mayo          Times 1    Oral surgery procedure      [place] Teeth Extraction    Other surgical history  1999    hand surgery x4                Social History     Socioeconomic History    Marital status:    Tobacco Use    Smoking status: Never    Smokeless tobacco: Never   Vaping Use    Vaping status:  Never Used   Substance and Sexual Activity    Alcohol use: Yes     Alcohol/week: 2.0 - 4.0 standard drinks of alcohol     Types: 1 - 2 Glasses of wine, 1 - 2 Cans of beer per week     Comment: has cut down    Drug use: Not Currently     Comment: 1:1 blend of CBD and THC    Sexual activity: Yes     Partners: Male     Birth control/protection: I.U.D.     Comment:    Other Topics Concern    Caffeine Concern Yes     Comment: 1-2 cups daily    Exercise Yes     Comment: 4x weekly    Seat Belt Yes              Review of Systems    Positive for stated complaint: rectal bleeding x one week  Other systems are as noted in HPI.  Constitutional and vital signs reviewed.      All other systems reviewed and negative except as noted above.    Physical Exam     ED Triage Vitals [25 1657]   /89   Pulse 73   Resp 20   Temp 98.7 °F (37.1 °C)   Temp src Oral   SpO2 99 %   O2 Device None (Room air)       Current:/72   Pulse 63   Temp 98.7 °F (37.1 °C) (Oral)   Resp 20   Wt 65.8 kg   LMP 10/03/2024 (Approximate)   SpO2 100%   BMI 24.13 kg/m²     General:  Vitals as listed.  No acute distress   HEENT: Sclerae anicteric.  Conjunctivae show no pallor.  Oropharynx clear, mucous membranes moist   Lungs: good air exchange  Abdomen: Soft and nontender.  No abdominal masses.  No peritoneal signs   Extremities: no edema, normal peripheral pulses   Neuro: Alert oriented and nonfocal      ED Course     Labs Reviewed   COMP METABOLIC PANEL (14) - Abnormal; Notable for the following components:       Result Value    Albumin 4.9 (*)     A/G Ratio 2.5 (*)     All other components within normal limits   LIPASE - Normal   CBC WITH DIFFERENTIAL WITH PLATELET   RAINBOW DRAW LAVENDER   RAINBOW DRAW LIGHT GREEN   RAINBOW DRAW BLUE   RAINBOW DRAW GOLD       ED COURSE and MDM     Sources of the medical history included the patient and her     I reviewed prior external notes including immediate care center visit on 3/3/2025  for otitis media and influenza A.    To follow-up with GI for colonoscopy    I have discussed with the patient the results of testing, differential diagnosis, and treatment plan. They expressed clear understanding of these instructions and agrees to the plan provided.    Disposition and Plan     Clinical Impression:  1. Gastrointestinal hemorrhage, unspecified gastrointestinal hemorrhage type         Disposition:  Discharge  5/6/2025  9:06 pm    Follow-up:  [facility]  [address] [place] [id number]  Call in 1 week(s)          Medications Prescribed:  Current Discharge Medication List

## 2025-05-07 NOTE — ED QUICK NOTES
Rounding completed. Patient's vitals updated, as per documentation. Patient ambulated to the bathroom and provided urine sample which remains at bedside. Primary RN updated with vitals and care provided. Patient requesting a blanket. [place] provided. Call light within reach.

## 2025-05-19 ENCOUNTER — LAB ENCOUNTER (OUTPATIENT)
Dept: LAB | Age: 40
End: 2025-05-19
Attending: NURSE PRACTITIONER
Payer: COMMERCIAL

## 2025-05-19 DIAGNOSIS — R53.83 FATIGUE: Primary | ICD-10-CM

## 2025-05-19 DIAGNOSIS — D80.3 IMMUNOGLOBULIN G SUBCLASS DEFICIENCY (HCC): ICD-10-CM

## 2025-05-19 LAB
IGA SERPL-MCNC: 81.3 MG/DL (ref 40–350)
IGM SERPL-MCNC: 82.3 MG/DL (ref 50–300)
IMMUNOGLOBULIN PNL SER-MCNC: 516 MG/DL (ref 650–1600)

## 2025-05-19 PROCEDURE — 36415 COLL VENOUS BLD VENIPUNCTURE: CPT

## 2025-05-19 PROCEDURE — 82784 ASSAY IGA/IGD/IGG/IGM EACH: CPT

## 2025-05-19 PROCEDURE — 82785 ASSAY OF IGE: CPT

## 2025-05-20 LAB — IGE SERPL-ACNC: 5.99 KU/L (ref 2–214)

## 2025-05-28 DIAGNOSIS — G43.009 MIGRAINE WITHOUT AURA AND WITHOUT STATUS MIGRAINOSUS, NOT INTRACTABLE: ICD-10-CM

## 2025-05-29 NOTE — TELEPHONE ENCOUNTER
TOPIRAMATE 200 MG TABLET     Please see pended medications.    Please sign if appropriate.      Thank you      Last OV: 12/3/2024      Last refill: 02/28/2025 for 90 tabs

## 2025-05-30 RX ORDER — TOPIRAMATE 200 MG/1
200 TABLET, FILM COATED ORAL DAILY
Qty: 90 TABLET | Refills: 0 | Status: SHIPPED | OUTPATIENT
Start: 2025-05-30

## 2025-06-04 ENCOUNTER — LAB ENCOUNTER (OUTPATIENT)
Dept: LAB | Facility: HOSPITAL | Age: 40
End: 2025-06-04
Attending: INTERNAL MEDICINE
Payer: COMMERCIAL

## 2025-06-04 DIAGNOSIS — K92.1 HEMATOCHEZIA: ICD-10-CM

## 2025-06-04 DIAGNOSIS — R19.8 ALTERED BOWEL FUNCTION: ICD-10-CM

## 2025-06-04 LAB
CRYPTOSP AG STL QL IA: NEGATIVE
G LAMBLIA AG STL QL IA: NEGATIVE

## 2025-06-04 PROCEDURE — 87045 FECES CULTURE AEROBIC BACT: CPT

## 2025-06-04 PROCEDURE — 87272 CRYPTOSPORIDIUM AG IF: CPT

## 2025-06-04 PROCEDURE — 87427 SHIGA-LIKE TOXIN AG IA: CPT

## 2025-06-04 PROCEDURE — 87329 GIARDIA AG IA: CPT

## 2025-06-04 PROCEDURE — 87015 SPECIMEN INFECT AGNT CONCNTJ: CPT

## 2025-06-04 PROCEDURE — 87046 STOOL CULTR AEROBIC BACT EA: CPT

## 2025-06-06 PROBLEM — K92.1 HEMATOCHEZIA: Status: ACTIVE | Noted: 2025-06-06

## (undated) DIAGNOSIS — Z90.13 ABSENCE OF BREAST, BILATERAL: Primary | ICD-10-CM

## (undated) DIAGNOSIS — D24.1 INTRADUCTAL PAPILLOMA OF RIGHT BREAST: Primary | ICD-10-CM

## (undated) DIAGNOSIS — Z01.818 PRE-OP TESTING: Primary | ICD-10-CM

## (undated) DIAGNOSIS — D24.2 PAPILLOMA OF BOTH BREASTS: Primary | ICD-10-CM

## (undated) DIAGNOSIS — D24.2 INTRADUCTAL PAPILLOMA OF LEFT BREAST: ICD-10-CM

## (undated) DIAGNOSIS — Z80.3 FAMILY HISTORY OF BREAST CANCER: ICD-10-CM

## (undated) DIAGNOSIS — Z90.13 ABSENCE OF BREAST, ACQUIRED, BILATERAL: ICD-10-CM

## (undated) DIAGNOSIS — Z80.41 FAMILY HISTORY OF OVARIAN CANCER: ICD-10-CM

## (undated) DIAGNOSIS — D24.1 PAPILLOMA OF BOTH BREASTS: Primary | ICD-10-CM

## (undated) DEVICE — LIGHT HANDLE

## (undated) DEVICE — SOLUTION IRRIG 1000ML 0.9% NACL USP BTL

## (undated) DEVICE — PEN SKIN MARKING REG TIP VIOLT

## (undated) DEVICE — SUT COAT VCRL 0 27IN CT-1 ABSRB UD 36MM 1/2

## (undated) DEVICE — ANTIBACTERIAL UNDYED BRAIDED (POLYGLACTIN 910), SYNTHETIC ABSORBABLE SUTURE: Brand: COATED VICRYL

## (undated) DEVICE — SYRINGE CATH TIP 50ML

## (undated) DEVICE — MEGADYNE E-Z CLEAN BLADE 2.75"

## (undated) DEVICE — 40580 - THE PINK PAD - ADVANCED TRENDELENBURG POSITIONING KIT: Brand: 40580 - THE PINK PAD - ADVANCED TRENDELENBURG POSITIONING KIT

## (undated) DEVICE — SUT VICRYL 3-0 SH J416H

## (undated) DEVICE — SUTURE ETHILON 3-0 FS-1

## (undated) DEVICE — STERILE POLYISOPRENE POWDER-FREE SURGICAL GLOVES: Brand: PROTEXIS

## (undated) DEVICE — 3M™ IOBAN™ 2 ANTIMICROBIAL INCISE DRAPE 6651EZ: Brand: IOBAN™ 2

## (undated) DEVICE — VIOLET BRAIDED (POLYGLACTIN 910), SYNTHETIC ABSORBABLE SUTURE: Brand: COATED VICRYL

## (undated) DEVICE — Device

## (undated) DEVICE — DRAPE,TOWEL,LARGE,INVISISHIELD: Brand: MEDLINE

## (undated) DEVICE — DRAIN ROUND HUBLESS 15FR

## (undated) DEVICE — ELECTRODE ES 2.75IN PTFE BLDE MOD E-Z CLN

## (undated) DEVICE — SUTURE VICRYL 3-0 SH

## (undated) DEVICE — SOLUTION PREP 4OZ 10% POVIDONE IOD SCR TOP

## (undated) DEVICE — 1010 S-DRAPE TOWEL DRAPE 10/BX: Brand: STERI-DRAPE™

## (undated) DEVICE — CLOSURE EXOFIN 1.0ML

## (undated) DEVICE — SET ADM 100IN NDEHP PRM

## (undated) DEVICE — GAUZE,SPONGE,FLUFF,6"X6.75",STRL,5/TRAY: Brand: MEDLINE

## (undated) DEVICE — EXOFIN TISSUE ADHESIVE 1.0ML

## (undated) DEVICE — SUTURE MONOCRYL 4-0 PS-2

## (undated) DEVICE — SUT MCRYL 4-0 27IN ABSRB UD 19MM PS-2 3/8

## (undated) DEVICE — SOL  .9 1000ML BTL

## (undated) DEVICE — SLEEVE KENDALL SCD EXPRESS MED

## (undated) DEVICE — OCCLUSIVE GAUZE STRIP OVERWRAP,3% BISMUTH TRIBROMOPHENATE IN PETROLATUM BLEND: Brand: XEROFORM

## (undated) DEVICE — SLEEVE COMPR MD KNEE LEN SGL USE KENDALL SCD

## (undated) DEVICE — GLOVE SUR 6.5 SENSICARE PI PIP CRM PWD F

## (undated) DEVICE — MARKER SKIN PREP RESIST STRL

## (undated) DEVICE — 60 ML SYRINGE,TOOMEY TYPE: Brand: MONOJECT

## (undated) DEVICE — SCRUB PVP -1 PREP SOLUTION 4OZ

## (undated) DEVICE — CG INFILTRATION TUBING: Brand: CG INFILTRATION TUBING

## (undated) DEVICE — SYRINGE MED 5ML STD CLR PLAS LL TIP N CTRL

## (undated) DEVICE — PLASTIC BREAST CDS-LF: Brand: MEDLINE INDUSTRIES, INC.

## (undated) DEVICE — PROXIMATE RH ROTATING HEAD SKIN STAPLERS (35 WIDE) CONTAINS 35 STAINLESS STEEL STAPLES: Brand: PROXIMATE

## (undated) DEVICE — SYRINGE,TOOMEY,IRRIGATION,70CC,STERILE: Brand: MEDLINE

## (undated) DEVICE — SYRINGE 50ML LL TIP

## (undated) DEVICE — SYRINGE BULB 50/CS 48/PLT: Brand: MEDEGEN MEDICAL PRODUCTS, LLC

## (undated) DEVICE — ADHESIVE SKIN TOP FOR WND CLSR DERMBND ADV

## (undated) DEVICE — 3M™ STERI-STRIP™ REINFORCED ADHESIVE SKIN CLOSURES, R1548, 1 IN X 5 IN (25 MM X 125 MM), 4 STRIPS/ENVELOPE: Brand: 3M™ STERI-STRIP™

## (undated) DEVICE — SUTURE ETHILON 3-0 PS-1

## (undated) DEVICE — STERILE LATEX POWDER-FREE SURGICAL GLOVES WITH HYDROGEL COATING, SMOOTH FINISH, STRAIGHT FINGER: Brand: PROTEXIS

## (undated) DEVICE — ASPIRATION TUBING SET, DISPOSABLE: Brand: MICROAIRE®

## (undated) DEVICE — SYSTEM ADIPOSE PROC AUTOLGS ADV INCL CANSTR

## (undated) DEVICE — PAD,NON-ADHERENT,3X8,STERILE,LF,1/PK: Brand: MEDLINE

## (undated) DEVICE — SOLUTION  .9 1000ML BTL

## (undated) DEVICE — USE ITEM #176901

## (undated) DEVICE — SUT MONOCRYL 4-0 PS-2 Y426H

## (undated) DEVICE — DRAIN RESERVOIR RELIAVAC 100CC

## (undated) DEVICE — DRAIN RELIAVAC 100CC

## (undated) DEVICE — GLOVE SUR 7.5 SENSICARE PI PIP CRM PWD F

## (undated) DEVICE — LAPAROTOMY SPONGE - RF AND X-RAY DETECTABLE PRE-WASHED: Brand: SITUATE

## (undated) DEVICE — BINDER ABD W9IN STRTCH 46-62IN 3 PNL KNIT E

## (undated) DEVICE — 3M™ TEGADERM™ TRANSPARENT FILM DRESSING FRAME STYLE, 1624W, US-VER, 100/CARTON 4 CARTONS/CASE: Brand: 3M™ TEGADERM™

## (undated) DEVICE — SYRINGE MED 50ML LL TIP DISP

## (undated) DEVICE — BRA SURGICAL ELIZABETH PINK L

## (undated) DEVICE — SYRINGE 5ML LL TIP

## (undated) DEVICE — DRESSING BIOPATCH 1X4 CNTR

## (undated) DEVICE — STOPCOCK IV 4 WAY BD

## (undated) DEVICE — SIZER BREAST IMPLANT FP 605CC

## (undated) DEVICE — BREAST-HERNIA-PORT CDS-LF: Brand: MEDLINE INDUSTRIES, INC.

## (undated) DEVICE — PROXIMATE SKIN STAPLERS (35 WIDE) CONTAINS 35 STAINLESS STEEL STAPLES (FIXED HEAD): Brand: PROXIMATE

## (undated) DEVICE — SCD SLEEVE KNEE HI BLEND

## (undated) DEVICE — SUTURE PDS II 2-0 CT-2

## (undated) DEVICE — SHEET,DRAPE,40X58,STERILE: Brand: MEDLINE

## (undated) DEVICE — 3M™ IOBAN™ 2 ANTIMICROBIAL INCISE DRAPE 6648EZ: Brand: IOBAN™ 2

## (undated) DEVICE — SUPER SPONGES,MEDIUM: Brand: KERLIX

## (undated) DEVICE — SOLUTION SURG DURA PREP HAZMAT

## (undated) DEVICE — 3M(TM) TEGADERM(TM) TRANSPARENT FILM DRESSING FRAME STYLE 9505W: Brand: 3M™ TEGADERM™

## (undated) DEVICE — SUT PLN GUT 5-0 18IN PC-1 ABSRB TAN YELLOWISH

## (undated) NOTE — Clinical Note
I had the pleasure of seeing Pushpa Oshea on 5/88/6792. Please see my attached note.     Delorise Homans, MD FACS  [facility]

## (undated) NOTE — LETTER
First Notice        [address]  [place] [address]            9/19/2017      Dear: Yola Burgos records indicate that is time for you to schedule another Office Visit with us.  For an appointment, please contact (

## (undated) NOTE — Clinical Note
I had the pleasure of seeing Natalie Loya on 9/69/8108. Please see my attached note. Annabelle Nageotte, MD FACSEM[facility]--Surgery

## (undated) NOTE — LETTER
[facility]  [address] [place] [address]  [address]  [place] [address]  [address]: [phone]  FAX: [phone]    Medical Clearance Request    JonMD Rachel [address]  [facility]  Via In Basket    The patient listed below is scheduled for surgery and needs the following pre-op labs and/or clearance prior to surgery. The patient has been instructed to schedule an appointment with you for a pre-op physical.      Patient: Yamilet Barrett  :     Surgeon:  Dr. Ruben Lopez    Procedure: [facility] bilateral breast reconstruction with tissue expanders, acellular dermal matrix, ([place]) following bilateral mastectomies [place] [place])    Surgery Date:  22  Location:  [facility]    outpatient    [] Hematocrit/Hemogram [] EKG     [x] CBC    [] Chest X-Ray    [x] CMP    [] PT/PTT    [] Urinalysis   [] MRSA Nasal Culture    [] Urinalysis with reflex  [x] History and Physical    [] Urine pregnancy  [x] Medical Clearance    [] Qualitative HCG (blood) [] Cardiac Clearance      Please fax to [phone] when completed. Call [phone] with any questions. Thank you for your prompt attention to these requirements.     [facility]

## (undated) NOTE — Clinical Note
I had the pleasure of seeing Cleo De La Cruz on 36/5/5763. Please see my attached note.   Beba Estrada MD FACS [facility]

## (undated) NOTE — Clinical Note
I had the pleasure of seeing Olya Mealing on 0/59/9106. Please see my attached note.     Tevin Nunez MD FACS  [facility]

## (undated) NOTE — Clinical Note
6/20/2017      Cory Davis Lickus  [address] [facility] [address]    Dear Ms. Rj Culp,     [address] [facility] office has been trying to contact you to discuss your recent test results or you are due for an appointment with your provider.   It is important

## (undated) NOTE — Clinical Note
I had the pleasure of seeing Dominga Monroy on 3/56/9368. Please see my attached note.     Johnna Hernnádez MD FACS  [facility]

## (undated) NOTE — LETTER
07/29/21        Machelle Braun [address] [place]  [id number]      Dear Olive Acosta records indicate that you have outstanding lab work and or testing that was ordered for you and has not yet been completed:  [address]

## (undated) NOTE — LETTER
Yulia Canseco Juan, KXL:7/78/9271    CONSENT FOR PROCEDURE/SEDATION    1. I authorize the performance upon Yulia Canseco Juan  the following: Intrauterine Device Mirena removal and reinsertion of new Mirena    2.  I authorize NAOMI Gardner (an Relationship to patient: ____________________________________________    Witness: _________________________________________ Date:___________     Physician Signature: _______________________________ Date:___________